# Patient Record
Sex: FEMALE | Race: WHITE | NOT HISPANIC OR LATINO | Employment: UNEMPLOYED | URBAN - METROPOLITAN AREA
[De-identification: names, ages, dates, MRNs, and addresses within clinical notes are randomized per-mention and may not be internally consistent; named-entity substitution may affect disease eponyms.]

---

## 2022-06-07 ENCOUNTER — APPOINTMENT (OUTPATIENT)
Dept: CT IMAGING | Facility: HOSPITAL | Age: 47
End: 2022-06-07

## 2022-06-07 ENCOUNTER — HOSPITAL ENCOUNTER (OUTPATIENT)
Facility: HOSPITAL | Age: 47
Setting detail: OBSERVATION
Discharge: HOME HEALTH | End: 2022-06-09
Attending: EMERGENCY MEDICINE | Admitting: STUDENT IN AN ORGANIZED HEALTH CARE EDUCATION/TRAINING PROGRAM

## 2022-06-07 ENCOUNTER — APPOINTMENT (OUTPATIENT)
Dept: GENERAL RADIOLOGY | Facility: HOSPITAL | Age: 47
End: 2022-06-07

## 2022-06-07 DIAGNOSIS — R51.9 ACUTE INTRACTABLE HEADACHE, UNSPECIFIED HEADACHE TYPE: ICD-10-CM

## 2022-06-07 DIAGNOSIS — R42 DIZZINESS: Primary | ICD-10-CM

## 2022-06-07 DIAGNOSIS — R42 VERTIGO: ICD-10-CM

## 2022-06-07 LAB
ABO GROUP BLD: NORMAL
ALBUMIN SERPL-MCNC: 4.5 G/DL (ref 3.5–5.2)
ALBUMIN/GLOB SERPL: 1.7 G/DL
ALP SERPL-CCNC: 83 U/L (ref 39–117)
ALT SERPL W P-5'-P-CCNC: 20 U/L (ref 1–33)
ANION GAP SERPL CALCULATED.3IONS-SCNC: 12 MMOL/L (ref 5–15)
AST SERPL-CCNC: 15 U/L (ref 1–32)
BASOPHILS # BLD AUTO: 0.1 10*3/MM3 (ref 0–0.2)
BASOPHILS NFR BLD AUTO: 1.2 % (ref 0–1.5)
BILIRUB SERPL-MCNC: 0.4 MG/DL (ref 0–1.2)
BLD GP AB SCN SERPL QL: NEGATIVE
BUN SERPL-MCNC: 9 MG/DL (ref 6–20)
BUN/CREAT SERPL: 12.2 (ref 7–25)
CALCIUM SPEC-SCNC: 10.3 MG/DL (ref 8.6–10.5)
CHLORIDE SERPL-SCNC: 103 MMOL/L (ref 98–107)
CO2 SERPL-SCNC: 24 MMOL/L (ref 22–29)
CREAT SERPL-MCNC: 0.74 MG/DL (ref 0.57–1)
DEPRECATED RDW RBC AUTO: 39.8 FL (ref 37–54)
EGFRCR SERPLBLD CKD-EPI 2021: 100.6 ML/MIN/1.73
EOSINOPHIL # BLD AUTO: 0.2 10*3/MM3 (ref 0–0.4)
EOSINOPHIL NFR BLD AUTO: 2.3 % (ref 0.3–6.2)
ERYTHROCYTE [DISTWIDTH] IN BLOOD BY AUTOMATED COUNT: 13.7 % (ref 12.3–15.4)
GLOBULIN UR ELPH-MCNC: 2.6 GM/DL
GLUCOSE SERPL-MCNC: 100 MG/DL (ref 65–99)
HCT VFR BLD AUTO: 38.6 % (ref 34–46.6)
HGB BLD-MCNC: 12.4 G/DL (ref 12–15.9)
HOLD SPECIMEN: NORMAL
HOLD SPECIMEN: NORMAL
INR PPP: 1.06 (ref 0.93–1.1)
LYMPHOCYTES # BLD AUTO: 2.7 10*3/MM3 (ref 0.7–3.1)
LYMPHOCYTES NFR BLD AUTO: 38.8 % (ref 19.6–45.3)
MAGNESIUM SERPL-MCNC: 2 MG/DL (ref 1.6–2.6)
MCH RBC QN AUTO: 26.3 PG (ref 26.6–33)
MCHC RBC AUTO-ENTMCNC: 32.2 G/DL (ref 31.5–35.7)
MCV RBC AUTO: 81.6 FL (ref 79–97)
MONOCYTES # BLD AUTO: 0.4 10*3/MM3 (ref 0.1–0.9)
MONOCYTES NFR BLD AUTO: 5.4 % (ref 5–12)
NEUTROPHILS NFR BLD AUTO: 3.6 10*3/MM3 (ref 1.7–7)
NEUTROPHILS NFR BLD AUTO: 52.3 % (ref 42.7–76)
NRBC BLD AUTO-RTO: 0.1 /100 WBC (ref 0–0.2)
PLATELET # BLD AUTO: 313 10*3/MM3 (ref 140–450)
PMV BLD AUTO: 6.9 FL (ref 6–12)
POTASSIUM SERPL-SCNC: 3.6 MMOL/L (ref 3.5–5.2)
PROT SERPL-MCNC: 7.1 G/DL (ref 6–8.5)
PROTHROMBIN TIME: 10.9 SECONDS (ref 9.6–11.7)
RBC # BLD AUTO: 4.73 10*6/MM3 (ref 3.77–5.28)
RH BLD: POSITIVE
SODIUM SERPL-SCNC: 139 MMOL/L (ref 136–145)
T&S EXPIRATION DATE: NORMAL
TSH SERPL DL<=0.05 MIU/L-ACNC: 3.18 UIU/ML (ref 0.27–4.2)
WBC NRBC COR # BLD: 7 10*3/MM3 (ref 3.4–10.8)
WHOLE BLOOD HOLD COAG: NORMAL
WHOLE BLOOD HOLD SPECIMEN: NORMAL

## 2022-06-07 PROCEDURE — 70496 CT ANGIOGRAPHY HEAD: CPT

## 2022-06-07 PROCEDURE — 93005 ELECTROCARDIOGRAM TRACING: CPT

## 2022-06-07 PROCEDURE — 96374 THER/PROPH/DIAG INJ IV PUSH: CPT

## 2022-06-07 PROCEDURE — 93005 ELECTROCARDIOGRAM TRACING: CPT | Performed by: EMERGENCY MEDICINE

## 2022-06-07 PROCEDURE — 99284 EMERGENCY DEPT VISIT MOD MDM: CPT

## 2022-06-07 PROCEDURE — 96376 TX/PRO/DX INJ SAME DRUG ADON: CPT

## 2022-06-07 PROCEDURE — 70450 CT HEAD/BRAIN W/O DYE: CPT

## 2022-06-07 PROCEDURE — 96372 THER/PROPH/DIAG INJ SC/IM: CPT

## 2022-06-07 PROCEDURE — 86900 BLOOD TYPING SEROLOGIC ABO: CPT

## 2022-06-07 PROCEDURE — 0 IOPAMIDOL PER 1 ML: Performed by: EMERGENCY MEDICINE

## 2022-06-07 PROCEDURE — 63710000001 ONDANSETRON ODT 4 MG TABLET DISPERSIBLE: Performed by: PHYSICIAN ASSISTANT

## 2022-06-07 PROCEDURE — 71045 X-RAY EXAM CHEST 1 VIEW: CPT

## 2022-06-07 PROCEDURE — 25010000002 KETOROLAC TROMETHAMINE PER 15 MG: Performed by: PHYSICIAN ASSISTANT

## 2022-06-07 PROCEDURE — 36415 COLL VENOUS BLD VENIPUNCTURE: CPT | Performed by: EMERGENCY MEDICINE

## 2022-06-07 PROCEDURE — 83735 ASSAY OF MAGNESIUM: CPT | Performed by: PHYSICIAN ASSISTANT

## 2022-06-07 PROCEDURE — 80050 GENERAL HEALTH PANEL: CPT | Performed by: PHYSICIAN ASSISTANT

## 2022-06-07 PROCEDURE — 70498 CT ANGIOGRAPHY NECK: CPT

## 2022-06-07 PROCEDURE — 82607 VITAMIN B-12: CPT | Performed by: STUDENT IN AN ORGANIZED HEALTH CARE EDUCATION/TRAINING PROGRAM

## 2022-06-07 PROCEDURE — 86900 BLOOD TYPING SEROLOGIC ABO: CPT | Performed by: PHYSICIAN ASSISTANT

## 2022-06-07 PROCEDURE — 85610 PROTHROMBIN TIME: CPT | Performed by: PHYSICIAN ASSISTANT

## 2022-06-07 PROCEDURE — 86901 BLOOD TYPING SEROLOGIC RH(D): CPT

## 2022-06-07 PROCEDURE — 86901 BLOOD TYPING SEROLOGIC RH(D): CPT | Performed by: PHYSICIAN ASSISTANT

## 2022-06-07 PROCEDURE — 99285 EMERGENCY DEPT VISIT HI MDM: CPT

## 2022-06-07 PROCEDURE — 86850 RBC ANTIBODY SCREEN: CPT | Performed by: PHYSICIAN ASSISTANT

## 2022-06-07 RX ORDER — ONDANSETRON 4 MG/1
4 TABLET, ORALLY DISINTEGRATING ORAL ONCE
Status: COMPLETED | OUTPATIENT
Start: 2022-06-07 | End: 2022-06-07

## 2022-06-07 RX ORDER — DEXAMETHASONE SODIUM PHOSPHATE 4 MG/ML
10 INJECTION, SOLUTION INTRA-ARTICULAR; INTRALESIONAL; INTRAMUSCULAR; INTRAVENOUS; SOFT TISSUE ONCE
Status: COMPLETED | OUTPATIENT
Start: 2022-06-07 | End: 2022-06-08

## 2022-06-07 RX ORDER — SODIUM CHLORIDE 0.9 % (FLUSH) 0.9 %
10 SYRINGE (ML) INJECTION AS NEEDED
Status: DISCONTINUED | OUTPATIENT
Start: 2022-06-07 | End: 2022-06-09 | Stop reason: HOSPADM

## 2022-06-07 RX ORDER — MECLIZINE HYDROCHLORIDE 25 MG/1
25 TABLET ORAL ONCE
Status: COMPLETED | OUTPATIENT
Start: 2022-06-07 | End: 2022-06-07

## 2022-06-07 RX ORDER — KETOROLAC TROMETHAMINE 30 MG/ML
30 INJECTION, SOLUTION INTRAMUSCULAR; INTRAVENOUS ONCE
Status: COMPLETED | OUTPATIENT
Start: 2022-06-07 | End: 2022-06-07

## 2022-06-07 RX ADMIN — MECLIZINE HYDROCHLORIDE 25 MG: 25 TABLET ORAL at 20:26

## 2022-06-07 RX ADMIN — IOPAMIDOL 100 ML: 755 INJECTION, SOLUTION INTRAVENOUS at 22:40

## 2022-06-07 RX ADMIN — ONDANSETRON 4 MG: 4 TABLET, ORALLY DISINTEGRATING ORAL at 23:52

## 2022-06-07 RX ADMIN — KETOROLAC TROMETHAMINE 30 MG: 30 INJECTION, SOLUTION INTRAMUSCULAR at 23:52

## 2022-06-07 NOTE — ED PROVIDER NOTES
Subjective       Patient is a 47-year-old female comes in complaining of multiple complaints since this morning.  Patient's last known well was around 10 PM last night.  Patient states that she woke up around 7:30 AM feeling dizzy like the room spinning as well as lightheaded like she may pass out.  Patient states that she has had issues with her right ear for the last few days and was seen by urgent care and had this drained and reports that she could hear better and no longer had any ear discomfort.  Patient was then referred to the ER for further evaluation given her symptoms today.  Patient denies any episode of issues like this in the past.  Patient states some intermittent blurred vision along with this.  Patient denies any blurred vision currently.  Patient denies any fever, chills, recent illness, vomiting, diarrhea, abdominal pain, chest pain, shortness of breath or cough.  Patient states that she has had some photophobia and nausea today.  Patient does report a history of migraines but states she is not treated for this.  Patient denies any falls or head injury.  Patient states she currently has a headache of her forehead bilaterally that is about 3 out of 10 that is constant and nonradiating and denies any thunderclap onset or worst headache of her life.        Review of Systems   Constitutional: Negative for chills, fatigue and fever.   HENT: Negative for congestion, sore throat, tinnitus and trouble swallowing.    Eyes: Positive for photophobia and visual disturbance (resolved). Negative for discharge.   Respiratory: Negative for cough, shortness of breath and wheezing.    Cardiovascular: Negative for chest pain, palpitations and leg swelling.   Gastrointestinal: Positive for nausea. Negative for abdominal pain, blood in stool, diarrhea and vomiting.   Genitourinary: Negative for dysuria, flank pain, frequency and urgency.   Musculoskeletal: Negative for arthralgias and myalgias.   Skin: Negative for  rash.   Neurological: Positive for dizziness and light-headedness. Negative for syncope, speech difficulty, weakness and headaches.   Psychiatric/Behavioral: Negative for confusion.       Past Medical History:   Diagnosis Date   • History of seasonal allergies        Allergies   Allergen Reactions   • Penicillin G Other (See Comments)   • Phenergan [Promethazine] Nausea Only   • Penicillins Nausea Only     States had had PCN from a dentist/didn't bother her then       Past Surgical History:   Procedure Laterality Date   • EAR TUBES     • HYSTERECTOMY      full   • TONSILLECTOMY         Family History   Problem Relation Age of Onset   • Pancreatic cancer Mother    • Hypertension Mother    • Ovarian cancer Sister    • Stomach cancer Maternal Grandfather        Social History     Socioeconomic History   • Marital status:    Tobacco Use   • Smoking status: Former Smoker     Quit date:      Years since quittin.4   • Smokeless tobacco: Never Used   Vaping Use   • Vaping Use: Never used   Substance and Sexual Activity   • Alcohol use: Yes     Alcohol/week: 2.0 standard drinks     Types: 2 Cans of beer per week     Comment: rarely   • Drug use: Defer   • Sexual activity: Defer           Objective   Physical Exam  Vitals and nursing note reviewed.   Constitutional:       General: She is not in acute distress.     Appearance: She is well-developed. She is not diaphoretic.   HENT:      Head: Normocephalic and atraumatic.      Right Ear: Tympanic membrane, ear canal and external ear normal. There is no impacted cerumen.      Left Ear: Tympanic membrane, ear canal and external ear normal. There is no impacted cerumen.      Nose: Nose normal. No congestion or rhinorrhea.      Mouth/Throat:      Mouth: Mucous membranes are moist.      Pharynx: No oropharyngeal exudate or posterior oropharyngeal erythema.   Eyes:      Extraocular Movements: Extraocular movements intact.      Conjunctiva/sclera: Conjunctivae normal.  "     Pupils: Pupils are equal, round, and reactive to light.   Cardiovascular:      Rate and Rhythm: Normal rate and regular rhythm.      Pulses: Normal pulses.      Heart sounds: Normal heart sounds.      Comments: S1, S2 audible.  Pulmonary:      Effort: Pulmonary effort is normal. No respiratory distress.      Breath sounds: Normal breath sounds. No wheezing, rhonchi or rales.      Comments: On room air.  Abdominal:      General: Bowel sounds are normal. There is no distension.      Palpations: Abdomen is soft.      Tenderness: There is no abdominal tenderness. There is no guarding or rebound.   Musculoskeletal:         General: No tenderness or deformity. Normal range of motion.      Cervical back: Normal range of motion.      Right lower leg: No edema.      Left lower leg: No edema.   Skin:     General: Skin is warm.      Capillary Refill: Capillary refill takes less than 2 seconds.      Findings: No erythema or rash.      Comments: No temporal tenderness bilaterally.   Neurological:      General: No focal deficit present.      Mental Status: She is alert and oriented to person, place, and time.      Cranial Nerves: No cranial nerve deficit.      Sensory: No sensory deficit.      Motor: No weakness.      Coordination: Coordination normal.      Comments: Cranial nerves II through XII intact.  Motor: 5+ upper extremity lower extremity bilaterally, flexors and extensors symmetric.  Sensation: Grossly intact to fine touch upper extremity and lower extremity bilaterally symmetric.  Cerebellar: FTN bilaterally.  No tremor noted. No Romberg drift.  Tone: Normal bulk and tone in upper and lower extremities.  No atrophy noted.   NIH of 0   Psychiatric:         Mood and Affect: Mood normal.         Behavior: Behavior normal.         Procedures           ED Course      /72   Pulse 68   Temp 97.9 °F (36.6 °C) (Oral)   Resp 16   Ht 154.9 cm (61\")   Wt 75.2 kg (165 lb 12.6 oz)   SpO2 100%   BMI 31.32 kg/m² "   Labs Reviewed   COMPREHENSIVE METABOLIC PANEL - Abnormal; Notable for the following components:       Result Value    Glucose 100 (*)     All other components within normal limits    Narrative:     GFR Normal >60  Chronic Kidney Disease <60  Kidney Failure <15     CBC WITH AUTO DIFFERENTIAL - Abnormal; Notable for the following components:    MCH 26.3 (*)     All other components within normal limits   PROTIME-INR - Normal   TSH - Normal   MAGNESIUM - Normal   COVID-19,CEPHEID/PRINCESS,COR/NELLY/PAD/JOSE ENRIQUE IN-HOUSE,NP SWAB IN TRANSPORT MEDIA 3-4 HR TAT, RT-PCR   RAINBOW DRAW    Narrative:     The following orders were created for panel order [place] Draw.  Procedure                               Abnormality         Status                     ---------                               -----------         ------                     Green Top (Gel)[[id number]]                                  Final result               Lavender Top[[id number]]                                     Final result               Gold Top - SST[[id number]]                                   Final result               Light Blue Top[[id number]]                                   Final result                 Please view results for these tests on the individual orders.   POCT GLUCOSE FINGERSTICK   TYPE AND SCREEN   BB ARMBAND CHECK   CBC AND DIFFERENTIAL    Narrative:     The following orders were created for panel order CBC & Differential.  Procedure                               Abnormality         Status                     ---------                               -----------         ------                     CBC Auto Differential[[id number]]        Abnormal            Final result                 Please view results for these tests on the individual orders.   GREEN TOP   LAVENDER TOP   GOLD TOP - SST   LIGHT BLUE TOP     CT Angiogram Neck    Result Date: 6/7/2022  1. No occlusion, significant stenosis, or evidence of dissection in the cervical carotid or  "vertebral arteries. 2. Normal CT angiography of the head. No large vessel occlusion or significant stenosis. 3. Enlarged and heterogeneous thyroid gland. Correlate with thyroid function tests. Electronically signed by:  Ralph Hi M.D.  6/7/2022 9:25 PM    XR Chest 1 View    Result Date: 6/7/2022  No active disease.  Electronically Signed By-Tr Portillo MD On:6/7/2022 8:00 PM This report was finalized on [id number] by  Tr Portillo MD.    CT Head Without Contrast Stroke Protocol    Result Date: 6/7/2022  IMPRESSION :  1. No acute findings. 2. Chronic maxillary sinus disease.  Electronically Signed By-Tr Portillo MD On:6/7/2022 10:32 PM This report was finalized on [id number] by  Tr Portillo MD.    CT Angiogram Head w AI Analysis of LVO    Result Date: 6/7/2022  1. No occlusion, significant stenosis, or evidence of dissection in the cervical carotid or vertebral arteries. 2. Normal CT angiography of the head. No large vessel occlusion or significant stenosis. 3. Enlarged and heterogeneous thyroid gland. Correlate with thyroid function tests. Electronically signed by:  Ralph Hi M.D.  6/7/2022 9:25 PM                                               MDM     Chart review: Allergies reviewed  EKG: EKG reviewed by myself interpreted by , shows sinus rhythm 64 bpm, no ST elevation apparent.    Imaging: See above  Labs: TSH normal.  CBC and CMP large unremarkable for acute findings.  COVID-19 swab refused by patient.  PT/INR normal.  Patient typed and screened.  Magnesium normal.    Vitals:  /72   Pulse 68   Temp 97.9 °F (36.6 °C) (Oral)   Resp 16   Ht 154.9 cm (61\")   Wt 75.2 kg (165 lb 12.6 oz)   SpO2 100%   BMI 31.32 kg/m²     Medications given:    Medications   sodium chloride 0.9 % flush 10 mL (has no administration in time range)   meclizine (ANTIVERT) tablet 25 mg (25 mg Oral Given 6/7/22 2026)   iopamidol (ISOVUE-370) 76 % injection 100 mL (100 mL Intravenous Given 6/7/22 2240) "   ketorolac (TORADOL) injection 30 mg (30 mg Intramuscular Given 6/7/22 2352)   ondansetron ODT (ZOFRAN-ODT) disintegrating tablet 4 mg (4 mg Oral Given 6/7/22 2352)   dexamethasone (DECADRON) injection 10 mg (10 mg Intravenous Given 6/8/22 0044)       Procedures:    MDM: Patient is a 47-year-old female comes in complaining of headache, dizziness.  Patient's dizziness is made worse with movement and position changes.  TSH normal.  CBC and CMP large unremarkable for acute findings.  COVID-19 swab refused by patient.  PT/INR normal.  Patient typed and screened.  Magnesium normal.  Patient was given meclizine with no relief.  Patient was given Toradol and Zofran for headache with minimal relief.  Spoke with on-call neurologist, Dr. Tinoco, who recommended MRI with and without contrast of the brain as well as IV Decadron 10 mg as well as admission to the hospital. These things were ordered.  Spoke with Dr. Calles, who accepted patient behalf of hospitalist team for admission to hospital further work-up and treatment.  See full discharge instructions for further details.  Results and plan discussed with patient and is agreeable with plan.    Final diagnoses:   Dizziness   Acute intractable headache, unspecified headache type       ED Disposition  ED Disposition     ED Disposition   Decision to Admit    Condition   --    Comment   Level of Care: Telemetry [5]   Admitting Physician: SHERRY CALLES [[id number]]   Attending Physician: SHERRY CALLES [[id number]]   Bed Request Comments: JACQUELINE               No follow-up provider specified.       Medication List      No changes were made to your prescriptions during this visit.          Dioni Joe PA  06/08/22 0103

## 2022-06-07 NOTE — ED NOTES
Pt states that she's been dizzy since this am. Pt states that nothing makes it better but the car ride to urgent care made it so much worse. Pt c/o of a pressure headache and its not like the migraines she's had before. Pt states she is very nauseous. Pt states she took some tylenol around 2-230 PM and it gave some relief but not much.  Pt states that her vision is different. She states that she wears readers but its hard to see.

## 2022-06-08 ENCOUNTER — APPOINTMENT (OUTPATIENT)
Dept: MRI IMAGING | Facility: HOSPITAL | Age: 47
End: 2022-06-08

## 2022-06-08 ENCOUNTER — APPOINTMENT (OUTPATIENT)
Dept: CARDIOLOGY | Facility: HOSPITAL | Age: 47
End: 2022-06-08

## 2022-06-08 PROBLEM — R42 DIZZINESS: Status: ACTIVE | Noted: 2022-06-08

## 2022-06-08 PROBLEM — G45.9 TRANSIENT ISCHEMIC ATTACK (TIA): Status: ACTIVE | Noted: 2022-06-08

## 2022-06-08 PROBLEM — F43.9 STRESS AT HOME: Status: ACTIVE | Noted: 2022-06-08

## 2022-06-08 LAB
ALBUMIN SERPL-MCNC: 4 G/DL (ref 3.5–5.2)
ALBUMIN/GLOB SERPL: 1.4 G/DL
ALP SERPL-CCNC: 83 U/L (ref 39–117)
ALT SERPL W P-5'-P-CCNC: 17 U/L (ref 1–33)
ANION GAP SERPL CALCULATED.3IONS-SCNC: 15 MMOL/L (ref 5–15)
AST SERPL-CCNC: 12 U/L (ref 1–32)
BILIRUB SERPL-MCNC: 0.3 MG/DL (ref 0–1.2)
BUN SERPL-MCNC: 14 MG/DL (ref 6–20)
BUN/CREAT SERPL: 15.1 (ref 7–25)
CALCIUM SPEC-SCNC: 9.4 MG/DL (ref 8.6–10.5)
CHLORIDE SERPL-SCNC: 103 MMOL/L (ref 98–107)
CHOLEST SERPL-MCNC: 174 MG/DL (ref 0–200)
CO2 SERPL-SCNC: 19 MMOL/L (ref 22–29)
CREAT SERPL-MCNC: 0.93 MG/DL (ref 0.57–1)
DEPRECATED RDW RBC AUTO: 39.4 FL (ref 37–54)
EGFRCR SERPLBLD CKD-EPI 2021: 76.4 ML/MIN/1.73
ERYTHROCYTE [DISTWIDTH] IN BLOOD BY AUTOMATED COUNT: 13.6 % (ref 12.3–15.4)
GLOBULIN UR ELPH-MCNC: 2.9 GM/DL
GLUCOSE SERPL-MCNC: 347 MG/DL (ref 65–99)
HBA1C MFR BLD: 5.6 % (ref 3.5–5.6)
HCT VFR BLD AUTO: 39.5 % (ref 34–46.6)
HDLC SERPL-MCNC: 47 MG/DL (ref 40–60)
HGB BLD-MCNC: 12.7 G/DL (ref 12–15.9)
LDLC SERPL CALC-MCNC: 107 MG/DL (ref 0–100)
LDLC/HDLC SERPL: 2.23 {RATIO}
MCH RBC QN AUTO: 26.2 PG (ref 26.6–33)
MCHC RBC AUTO-ENTMCNC: 32.3 G/DL (ref 31.5–35.7)
MCV RBC AUTO: 81.3 FL (ref 79–97)
PLATELET # BLD AUTO: 319 10*3/MM3 (ref 140–450)
PMV BLD AUTO: 7.2 FL (ref 6–12)
POTASSIUM SERPL-SCNC: 4.3 MMOL/L (ref 3.5–5.2)
PROT SERPL-MCNC: 6.9 G/DL (ref 6–8.5)
RBC # BLD AUTO: 4.86 10*6/MM3 (ref 3.77–5.28)
SARS-COV-2 RNA PNL SPEC NAA+PROBE: NOT DETECTED
SODIUM SERPL-SCNC: 137 MMOL/L (ref 136–145)
TRIGL SERPL-MCNC: 111 MG/DL (ref 0–150)
TSH SERPL DL<=0.05 MIU/L-ACNC: 0.82 UIU/ML (ref 0.27–4.2)
VIT B12 BLD-MCNC: 407 PG/ML (ref 211–946)
VLDLC SERPL-MCNC: 20 MG/DL (ref 5–40)
WBC NRBC COR # BLD: 14 10*3/MM3 (ref 3.4–10.8)

## 2022-06-08 PROCEDURE — G0378 HOSPITAL OBSERVATION PER HR: HCPCS

## 2022-06-08 PROCEDURE — 83036 HEMOGLOBIN GLYCOSYLATED A1C: CPT | Performed by: INTERNAL MEDICINE

## 2022-06-08 PROCEDURE — 97166 OT EVAL MOD COMPLEX 45 MIN: CPT

## 2022-06-08 PROCEDURE — 99219 PR INITIAL OBSERVATION CARE/DAY 50 MINUTES: CPT | Performed by: INTERNAL MEDICINE

## 2022-06-08 PROCEDURE — 95992 CANALITH REPOSITIONING PROC: CPT

## 2022-06-08 PROCEDURE — 96375 TX/PRO/DX INJ NEW DRUG ADDON: CPT

## 2022-06-08 PROCEDURE — 25010000002 GADOTERIDOL PER 1 ML: Performed by: STUDENT IN AN ORGANIZED HEALTH CARE EDUCATION/TRAINING PROGRAM

## 2022-06-08 PROCEDURE — 25010000002 ENOXAPARIN PER 10 MG: Performed by: INTERNAL MEDICINE

## 2022-06-08 PROCEDURE — A9579 GAD-BASE MR CONTRAST NOS,1ML: HCPCS | Performed by: STUDENT IN AN ORGANIZED HEALTH CARE EDUCATION/TRAINING PROGRAM

## 2022-06-08 PROCEDURE — 36415 COLL VENOUS BLD VENIPUNCTURE: CPT | Performed by: STUDENT IN AN ORGANIZED HEALTH CARE EDUCATION/TRAINING PROGRAM

## 2022-06-08 PROCEDURE — 80050 GENERAL HEALTH PANEL: CPT | Performed by: INTERNAL MEDICINE

## 2022-06-08 PROCEDURE — 97162 PT EVAL MOD COMPLEX 30 MIN: CPT

## 2022-06-08 PROCEDURE — 99214 OFFICE O/P EST MOD 30 MIN: CPT | Performed by: PSYCHIATRY & NEUROLOGY

## 2022-06-08 PROCEDURE — 85027 COMPLETE CBC AUTOMATED: CPT | Performed by: STUDENT IN AN ORGANIZED HEALTH CARE EDUCATION/TRAINING PROGRAM

## 2022-06-08 PROCEDURE — 80048 BASIC METABOLIC PNL TOTAL CA: CPT | Performed by: STUDENT IN AN ORGANIZED HEALTH CARE EDUCATION/TRAINING PROGRAM

## 2022-06-08 PROCEDURE — 70553 MRI BRAIN STEM W/O & W/DYE: CPT

## 2022-06-08 PROCEDURE — 87635 SARS-COV-2 COVID-19 AMP PRB: CPT | Performed by: PHYSICIAN ASSISTANT

## 2022-06-08 PROCEDURE — 25010000002 DEXAMETHASONE PER 1 MG: Performed by: PHYSICIAN ASSISTANT

## 2022-06-08 PROCEDURE — 25010000002 DEXAMETHASONE PER 1 MG: Performed by: STUDENT IN AN ORGANIZED HEALTH CARE EDUCATION/TRAINING PROGRAM

## 2022-06-08 PROCEDURE — 93306 TTE W/DOPPLER COMPLETE: CPT | Performed by: INTERNAL MEDICINE

## 2022-06-08 PROCEDURE — 80061 LIPID PANEL: CPT | Performed by: INTERNAL MEDICINE

## 2022-06-08 PROCEDURE — 93306 TTE W/DOPPLER COMPLETE: CPT

## 2022-06-08 RX ORDER — BUTALBITAL, ACETAMINOPHEN AND CAFFEINE 50; 325; 40 MG/1; MG/1; MG/1
2 TABLET ORAL EVERY 6 HOURS PRN
Status: DISCONTINUED | OUTPATIENT
Start: 2022-06-08 | End: 2022-06-09 | Stop reason: HOSPADM

## 2022-06-08 RX ORDER — ENOXAPARIN SODIUM 100 MG/ML
40 INJECTION SUBCUTANEOUS DAILY
Status: DISCONTINUED | OUTPATIENT
Start: 2022-06-08 | End: 2022-06-09 | Stop reason: HOSPADM

## 2022-06-08 RX ORDER — ASPIRIN 300 MG/1
300 SUPPOSITORY RECTAL DAILY
Status: DISCONTINUED | OUTPATIENT
Start: 2022-06-08 | End: 2022-06-09 | Stop reason: HOSPADM

## 2022-06-08 RX ORDER — SUMATRIPTAN 25 MG/1
25 TABLET, FILM COATED ORAL ONCE
Status: COMPLETED | OUTPATIENT
Start: 2022-06-08 | End: 2022-06-08

## 2022-06-08 RX ORDER — DEXAMETHASONE SODIUM PHOSPHATE 4 MG/ML
6 INJECTION, SOLUTION INTRA-ARTICULAR; INTRALESIONAL; INTRAMUSCULAR; INTRAVENOUS; SOFT TISSUE DAILY
Status: DISCONTINUED | OUTPATIENT
Start: 2022-06-08 | End: 2022-06-09 | Stop reason: HOSPADM

## 2022-06-08 RX ORDER — ASPIRIN 325 MG
325 TABLET ORAL DAILY
Status: DISCONTINUED | OUTPATIENT
Start: 2022-06-08 | End: 2022-06-09 | Stop reason: HOSPADM

## 2022-06-08 RX ORDER — ATORVASTATIN CALCIUM 40 MG/1
80 TABLET, FILM COATED ORAL NIGHTLY
Status: DISCONTINUED | OUTPATIENT
Start: 2022-06-08 | End: 2022-06-09 | Stop reason: HOSPADM

## 2022-06-08 RX ORDER — BUTALBITAL, ACETAMINOPHEN AND CAFFEINE 50; 325; 40 MG/1; MG/1; MG/1
1 TABLET ORAL EVERY 4 HOURS PRN
Status: DISCONTINUED | OUTPATIENT
Start: 2022-06-08 | End: 2022-06-08 | Stop reason: SDUPTHER

## 2022-06-08 RX ADMIN — ATORVASTATIN CALCIUM 80 MG: 40 TABLET, FILM COATED ORAL at 03:24

## 2022-06-08 RX ADMIN — BUTALBITAL, ACETAMINOPHEN AND CAFFEINE 2 TABLET: 50; 325; 40 TABLET ORAL at 14:45

## 2022-06-08 RX ADMIN — SUMATRIPTAN SUCCINATE 25 MG: 25 TABLET ORAL at 21:24

## 2022-06-08 RX ADMIN — ENOXAPARIN SODIUM 40 MG: 100 INJECTION SUBCUTANEOUS at 03:24

## 2022-06-08 RX ADMIN — ASPIRIN 325 MG ORAL TABLET 325 MG: 325 PILL ORAL at 03:24

## 2022-06-08 RX ADMIN — GADOTERIDOL 15 ML: 279.3 INJECTION, SOLUTION INTRAVENOUS at 10:30

## 2022-06-08 RX ADMIN — DEXAMETHASONE SODIUM PHOSPHATE 10 MG: 4 INJECTION, SOLUTION INTRAMUSCULAR; INTRAVENOUS at 00:44

## 2022-06-08 RX ADMIN — ATORVASTATIN CALCIUM 80 MG: 40 TABLET, FILM COATED ORAL at 21:24

## 2022-06-08 RX ADMIN — DEXAMETHASONE SODIUM PHOSPHATE 6 MG: 4 INJECTION, SOLUTION INTRAMUSCULAR; INTRAVENOUS at 12:56

## 2022-06-08 NOTE — DISCHARGE INSTR - OTHER ORDERS
[facility] - Follow-up for outpatient rehab  [phone] [address] [place] [address]

## 2022-06-08 NOTE — PLAN OF CARE
Goal Outcome Evaluation:      Orders generated as per stroke workup. The patient passed the stroke swallow screen and can initiate diet as per MD order/recommendations. CT of the head negative. MRI showed no acute intracranial abnormality. Per chart, all deficits have resolved. ST will complete order and sign off at this time as per protocol. Please re-consult if our services are warranted in the future. Thank you.
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
## 2022-06-08 NOTE — CASE MANAGEMENT/SOCIAL WORK
Discharge Planning Assessment  [facility]     Patient Name: Queta Cuadra  MRN: [id number]  Today's Date: 6/8/2022    Admit Date: 6/7/2022     Discharge Needs Assessment     Row Name 06/08/22 0825       Living Environment    People in Home significant other    Current Living Arrangements home    Primary Care Provided by self    Provides Primary Care For no one    Family Caregiver if Needed significant other    Quality of Family Relationships helpful;involved    Able to Return to Prior Arrangements yes       Resource/Environmental Concerns    Resource/Environmental Concerns none    Transportation Concerns none       Transition Planning    Patient/Family Anticipates Transition to home with family    Patient/Family Anticipated Services at Transition     Transportation Anticipated car, drives self;family or friend will provide       Discharge Needs Assessment    Readmission Within the Last 30 Days no previous admission in last 30 days    Equipment Currently Used at Home none    Concerns to be Addressed no discharge needs identified    Anticipated Changes Related to Illness none    Provided Post Acute Provider List? N/A    Provided Post Acute Provider Quality & Resource List? N/A               Discharge Plan     Row Name 06/08/22 0827       Plan    Plan DC PLAN: Routine home, Pending PT/OT eval    Patient/Family in Agreement with Plan yes    Provided Post Acute Provider List? N/A    Provided Post Acute Provider Quality & Resource List? N/A    Plan Comments Met with patient and  at bedside. From Routine home with  [place], Independent with ADL's, No DME. Verified PCP and Pharmacy. No transportation issues. Able to afford medications. Pending PT/OT eval for further discharge plan.                 Demographic Summary     Row Name 06/08/22 0824       General Information    Admission Type observation    Arrived From emergency department    Referral Source emergency department    Reason for Consult care  coordination/care conference;discharge planning    Preferred Language English       Contact Information    Permission Granted to Share Info With     Contact Information Obtained for                Functional Status     Row Name 06/08/22 0825       Functional Status    Usual Activity Tolerance moderate    Current Activity Tolerance moderate       Functional Status, IADL    Medications independent    Meal Preparation independent    Housekeeping independent    Laundry independent    Shopping independent       Mental Status    General Appearance WDL WDL              Met with patient in room wearing PPE: mask,     Maintained distance greater than six feet and spent less than 15 minutes in the room.      Mónica Steinberg RN      Office phone: [phone]  Cell phone: [phone]

## 2022-06-08 NOTE — PROGRESS NOTES
[facility] Medicine Services Daily Progress Note    Patient Name: Queta Cuadra  : 1975  MRN: [id number]  Primary Care Physician:  Fay De Jesus MD  Date of admission: 2022      Subjective      Chief Complaint: Vertigo      2022  Patient complains of vertigo that is worse with motion and slightly better with rest, also complains of headache    Review of Systems   Constitutional: Positive for malaise/fatigue. Negative for chills and fever.   HENT: Negative for ear discharge, ear pain and hearing loss.    Eyes: Negative for discharge, double vision and pain.   Cardiovascular: Negative for chest pain, dyspnea on exertion and leg swelling.   Respiratory: Negative for cough, hemoptysis and shortness of breath.    Endocrine: Negative for polydipsia, polyphagia and polyuria.   Skin: Negative for dry skin, flushing and itching.   Musculoskeletal: Negative for arthritis, back pain and falls.   Gastrointestinal: Negative for abdominal pain, constipation and diarrhea.   Genitourinary: Negative for dysuria, flank pain and frequency.   Neurological: Positive for headaches, vertigo and weakness.   Psychiatric/Behavioral: Negative for altered mental status, depression and hallucinations.          Objective      Vitals:   Temp:  [97.9 °F (36.6 °C)-98.4 °F (36.9 °C)] 97.9 °F (36.6 °C)  Heart Rate:  [64-85] 68  Resp:  [11-18] 13  BP: (107-130)/(61-84) 127/84    Physical Exam  Constitutional:       General: She is not in acute distress.     Appearance: Normal appearance.   HENT:      Head: Normocephalic and atraumatic.      Nose: Nose normal.      Mouth/Throat:      Pharynx: Oropharynx is clear.   Eyes:      General: No scleral icterus.     Extraocular Movements: Extraocular movements intact.      Pupils: Pupils are equal, round, and reactive to light.      Comments: Slight horizontal nystagmus   Cardiovascular:      Rate and Rhythm: Normal rate and regular rhythm.      Heart sounds: No  murmur heard.    No friction rub. No gallop.   Pulmonary:      Effort: No respiratory distress.      Breath sounds: No wheezing or rales.   Abdominal:      General: There is no distension.      Tenderness: There is no abdominal tenderness. There is no guarding.   Musculoskeletal:         General: No swelling or deformity.      Cervical back: Normal range of motion. No rigidity.      Right lower leg: No edema.      Left lower leg: No edema.   Skin:     Coloration: Skin is not jaundiced.      Findings: No bruising or lesion.   Neurological:      General: No focal deficit present.      Mental Status: She is alert and oriented to person, place, and time.      Motor: Weakness present.   Psychiatric:         Mood and Affect: Mood normal.         Behavior: Behavior normal.         Thought Content: Thought content normal.         Judgment: Judgment normal.             Result Review    Result Review:  I have personally reviewed the results from the time of this admission to 6/8/2022 10:41 EDT and agree with these findings:  [x]  Laboratory  []  Microbiology  [x]  Radiology  []  EKG/Telemetry   []  Cardiology/Vascular   []  Pathology  []  Old records  []  Other:            Assessment & Plan      Brief Patient Summary:  Queta Cuadra is a 47 y.o. female who presented due to vertigo      aspirin, 325 mg, Oral, Daily   Or  aspirin, 300 mg, Rectal, Daily  atorvastatin, 80 mg, Oral, Nightly  enoxaparin, 40 mg, Subcutaneous, Daily  gadoteridol, 15 mL, Intravenous, Once in imaging             Active Hospital Problems:  Active Hospital Problems    Diagnosis    • Vertigo    • Stress at home    • Transient ischemic attack (TIA)      Plan:   #Vertigo    - admits to severe vertigo with since yesterday monring    - ED contacted Neuro, MRI recommended along with IV steroids    - rule out basilar stroke vs demyelination disease    - decadron 6mg IV daily    - Neuro consulted    - CT head without mass, shift, or hemorrhage    - CTA  head/neck without occlsion, significant stenosis, or evidence of dissection    - MRI brain with and without contrast     - will cover with stroke protocol: aspirin, statin, echo    - Possible BPPV in the setting of waxy ears recently cleaned out complicated by home stress    - TSH 3.18    - PT/OT/SLP    - Covid negative    - EKG NSR without evidence of ischemia      DVT prophylaxis:  Medical and mechanical DVT prophylaxis orders are present.    CODE STATUS:    Level Of Support Discussed With: Patient  Code Status (Patient has no pulse and is not breathing): CPR (Attempt to Resuscitate)  Medical Interventions (Patient has pulse or is breathing): Full Support      Disposition:  I expect patient to be discharged in 1-2 days.    This patient has been examined wearing appropriate Personal Protective Equipment and discussed with PAtient and . 06/08/22      Electronically signed by Lobo Rios DO, 06/08/22, 10:41 EDT.  [facility] Hospitalist Team
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
## 2022-06-09 ENCOUNTER — HOME HEALTH ADMISSION (OUTPATIENT)
Dept: HOME HEALTH SERVICES | Facility: HOME HEALTHCARE | Age: 47
End: 2022-06-09

## 2022-06-09 ENCOUNTER — READMISSION MANAGEMENT (OUTPATIENT)
Dept: CALL CENTER | Facility: HOSPITAL | Age: 47
End: 2022-06-09

## 2022-06-09 VITALS
HEIGHT: 61 IN | DIASTOLIC BLOOD PRESSURE: 61 MMHG | TEMPERATURE: 97.8 F | HEART RATE: 89 BPM | OXYGEN SATURATION: 98 % | WEIGHT: 165 LBS | BODY MASS INDEX: 31.15 KG/M2 | SYSTOLIC BLOOD PRESSURE: 97 MMHG | RESPIRATION RATE: 14 BRPM

## 2022-06-09 LAB
ANION GAP SERPL CALCULATED.3IONS-SCNC: 16 MMOL/L (ref 5–15)
BH CV ECHO MEAS - ACS: 1.97 CM
BH CV ECHO MEAS - AO MAX PG: 7.2 MMHG
BH CV ECHO MEAS - AO MEAN PG: 4.1 MMHG
BH CV ECHO MEAS - AO ROOT DIAM: 3.1 CM
BH CV ECHO MEAS - AO V2 MAX: 134.5 CM/SEC
BH CV ECHO MEAS - AO V2 VTI: 27.8 CM
BH CV ECHO MEAS - AVA(I,D): 2.8 CM2
BH CV ECHO MEAS - EDV(CUBED): 82 ML
BH CV ECHO MEAS - EDV(MOD-SP4): 78.3 ML
BH CV ECHO MEAS - EF(MOD-BP): 65 %
BH CV ECHO MEAS - EF(MOD-SP4): 65 %
BH CV ECHO MEAS - ESV(CUBED): 19.6 ML
BH CV ECHO MEAS - ESV(MOD-SP4): 27.4 ML
BH CV ECHO MEAS - FS: 37.9 %
BH CV ECHO MEAS - IVS/LVPW: 1.1 CM
BH CV ECHO MEAS - IVSD: 0.75 CM
BH CV ECHO MEAS - LA A2CS (ATRIAL LENGTH): 2.6 CM
BH CV ECHO MEAS - LV DIASTOLIC VOL/BSA (35-75): 45 CM2
BH CV ECHO MEAS - LV MASS(C)D: 93.2 GRAMS
BH CV ECHO MEAS - LV MAX PG: 6.3 MMHG
BH CV ECHO MEAS - LV MEAN PG: 3.3 MMHG
BH CV ECHO MEAS - LV SYSTOLIC VOL/BSA (12-30): 15.8 CM2
BH CV ECHO MEAS - LV V1 MAX: 125.9 CM/SEC
BH CV ECHO MEAS - LV V1 VTI: 24.7 CM
BH CV ECHO MEAS - LVIDD: 4.3 CM
BH CV ECHO MEAS - LVIDS: 2.7 CM
BH CV ECHO MEAS - LVOT AREA: 3.1 CM2
BH CV ECHO MEAS - LVOT DIAM: 1.99 CM
BH CV ECHO MEAS - LVPWD: 0.69 CM
BH CV ECHO MEAS - MV A MAX VEL: 83.3 CM/SEC
BH CV ECHO MEAS - MV DEC SLOPE: 343.4 CM/SEC2
BH CV ECHO MEAS - MV DEC TIME: 0.19 MSEC
BH CV ECHO MEAS - MV E MAX VEL: 66 CM/SEC
BH CV ECHO MEAS - MV E/A: 0.79
BH CV ECHO MEAS - MV MAX PG: 2.9 MMHG
BH CV ECHO MEAS - MV MEAN PG: 1.59 MMHG
BH CV ECHO MEAS - MV V2 VTI: 17.5 CM
BH CV ECHO MEAS - MVA(VTI): 4.4 CM2
BH CV ECHO MEAS - PA V2 MAX: 94.3 CM/SEC
BH CV ECHO MEAS - PULM A REVS DUR: 0.09 SEC
BH CV ECHO MEAS - PULM A REVS VEL: 39.9 CM/SEC
BH CV ECHO MEAS - PULM DIAS VEL: 38.9 CM/SEC
BH CV ECHO MEAS - PULM S/D: 1.52
BH CV ECHO MEAS - PULM SYS VEL: 59 CM/SEC
BH CV ECHO MEAS - QP/QS: 0.53
BH CV ECHO MEAS - RV MAX PG: 3.3 MMHG
BH CV ECHO MEAS - RV V1 MAX: 90.5 CM/SEC
BH CV ECHO MEAS - RV V1 VTI: 21.2 CM
BH CV ECHO MEAS - RVDD: 1.85 CM
BH CV ECHO MEAS - RVOT DIAM: 1.57 CM
BH CV ECHO MEAS - SI(MOD-SP4): 29.2 ML/M2
BH CV ECHO MEAS - SV(LVOT): 77.3 ML
BH CV ECHO MEAS - SV(MOD-SP4): 50.9 ML
BH CV ECHO MEAS - SV(RVOT): 41 ML
BUN SERPL-MCNC: 16 MG/DL (ref 6–20)
BUN/CREAT SERPL: 20 (ref 7–25)
CALCIUM SPEC-SCNC: 9.6 MG/DL (ref 8.6–10.5)
CHLORIDE SERPL-SCNC: 111 MMOL/L (ref 98–107)
CO2 SERPL-SCNC: 20 MMOL/L (ref 22–29)
CREAT SERPL-MCNC: 0.8 MG/DL (ref 0.57–1)
DEPRECATED RDW RBC AUTO: 39.8 FL (ref 37–54)
EGFRCR SERPLBLD CKD-EPI 2021: 91.6 ML/MIN/1.73
ERYTHROCYTE [DISTWIDTH] IN BLOOD BY AUTOMATED COUNT: 13.8 % (ref 12.3–15.4)
GLUCOSE BLDC GLUCOMTR-MCNC: 102 MG/DL (ref 70–105)
GLUCOSE BLDC GLUCOMTR-MCNC: 137 MG/DL (ref 70–105)
GLUCOSE BLDC GLUCOMTR-MCNC: 141 MG/DL (ref 70–105)
GLUCOSE SERPL-MCNC: 181 MG/DL (ref 65–99)
HCT VFR BLD AUTO: 40.6 % (ref 34–46.6)
HGB BLD-MCNC: 13.2 G/DL (ref 12–15.9)
MAXIMAL PREDICTED HEART RATE: 173 BPM
MCH RBC QN AUTO: 26.5 PG (ref 26.6–33)
MCHC RBC AUTO-ENTMCNC: 32.5 G/DL (ref 31.5–35.7)
MCV RBC AUTO: 81.4 FL (ref 79–97)
PLATELET # BLD AUTO: 317 10*3/MM3 (ref 140–450)
PMV BLD AUTO: 7.1 FL (ref 6–12)
POTASSIUM SERPL-SCNC: 4.7 MMOL/L (ref 3.5–5.2)
RBC # BLD AUTO: 4.99 10*6/MM3 (ref 3.77–5.28)
SODIUM SERPL-SCNC: 147 MMOL/L (ref 136–145)
STRESS TARGET HR: 147 BPM
WBC NRBC COR # BLD: 15 10*3/MM3 (ref 3.4–10.8)

## 2022-06-09 PROCEDURE — 82962 GLUCOSE BLOOD TEST: CPT

## 2022-06-09 PROCEDURE — 99217 PR OBSERVATION CARE DISCHARGE MANAGEMENT: CPT | Performed by: STUDENT IN AN ORGANIZED HEALTH CARE EDUCATION/TRAINING PROGRAM

## 2022-06-09 PROCEDURE — G0378 HOSPITAL OBSERVATION PER HR: HCPCS

## 2022-06-09 PROCEDURE — 25010000002 DEXAMETHASONE PER 1 MG: Performed by: STUDENT IN AN ORGANIZED HEALTH CARE EDUCATION/TRAINING PROGRAM

## 2022-06-09 PROCEDURE — 95992 CANALITH REPOSITIONING PROC: CPT

## 2022-06-09 PROCEDURE — 97116 GAIT TRAINING THERAPY: CPT

## 2022-06-09 PROCEDURE — 96376 TX/PRO/DX INJ SAME DRUG ADON: CPT

## 2022-06-09 RX ORDER — SUMATRIPTAN 50 MG/1
50 TABLET, FILM COATED ORAL
Status: DISCONTINUED | OUTPATIENT
Start: 2022-06-09 | End: 2022-06-09 | Stop reason: HOSPADM

## 2022-06-09 RX ORDER — MECLIZINE HYDROCHLORIDE 25 MG/1
25 TABLET ORAL 3 TIMES DAILY PRN
Qty: 30 TABLET | Refills: 0 | Status: SHIPPED | OUTPATIENT
Start: 2022-06-09

## 2022-06-09 RX ORDER — SODIUM CHLORIDE 450 MG/100ML
100 INJECTION, SOLUTION INTRAVENOUS CONTINUOUS
Status: DISCONTINUED | OUTPATIENT
Start: 2022-06-09 | End: 2022-06-09 | Stop reason: HOSPADM

## 2022-06-09 RX ORDER — SUMATRIPTAN 50 MG/1
50 TABLET, FILM COATED ORAL
Qty: 30 TABLET | Refills: 1 | Status: SHIPPED | OUTPATIENT
Start: 2022-06-09

## 2022-06-09 RX ORDER — PREDNISONE 10 MG/1
TABLET ORAL
Qty: 20 TABLET | Refills: 0 | Status: SHIPPED | OUTPATIENT
Start: 2022-06-09 | End: 2022-06-17

## 2022-06-09 RX ADMIN — SUMATRIPTAN SUCCINATE 50 MG: 50 TABLET ORAL at 11:14

## 2022-06-09 RX ADMIN — SUMATRIPTAN SUCCINATE 50 MG: 50 TABLET ORAL at 17:20

## 2022-06-09 RX ADMIN — ASPIRIN 325 MG ORAL TABLET 325 MG: 325 PILL ORAL at 11:20

## 2022-06-09 RX ADMIN — DEXAMETHASONE SODIUM PHOSPHATE 6 MG: 4 INJECTION, SOLUTION INTRAMUSCULAR; INTRAVENOUS at 11:20

## 2022-06-09 NOTE — PLAN OF CARE
Assessment: Queta Cuadra presents with functional mobility impairments which indicate the need for skilled intervention. Tolerating session today without incident. Pt continues to complaint of dizziness with mobility, and terrible pressure headache. States it was improved this AM, but has become worse as the day goes on. PT performed epley maneuver x2 this date, with pt reporting slight improvement following each. Pt does c/o of pressure when head extended over EOB and states she can feel the pressure shift with head turns. VOR, benny pursuit and head head impulse tested and pt did not tolerate well, but no significant nystagmus nor saccadic movements notice. No nystagmus noted with epley maneuver this date either, just c/o pressure and dizziness when moving into lying position. Pt states all motion, including moving bed makes her very dizzy. Will continue to follow and progress as tolerated.

## 2022-06-09 NOTE — DISCHARGE SUMMARY
[facility] Medicine Services  DISCHARGE SUMMARY    Patient Name: Queta Cuadra  : 1975  MRN: [id number]    Date of Admission: 2022  Discharge Diagnosis: Migraine with vestibular dysfunction  Date of Discharge:  2022  Primary Care Physician: Fay De Jesus MD      Presenting Problem:   Dizziness [R42]  Vertigo [R42]  Acute intractable headache, unspecified headache type [R51.9]    Active and Resolved Hospital Problems:  Active Hospital Problems    Diagnosis POA   • Vertigo [R42] Yes   • Stress at home [F43.9] Not Applicable   • Transient ischemic attack (TIA) [G45.9] Yes      Resolved Hospital Problems   No resolved problems to display.         Hospital Course     Hospital Course:  Queta Cuadra is a 47 y.o. female who presented to [facility] on 2022 due to dizziness and headache affecting balance.  ED called neurology and stroke workup recommended.  CT head without mass/shift/hemorrhage.  CTA head/neck without significant stenosis or thrombosis.  MRI without ischemia.  Neuro suspected vertiginous migraine.  PT recommended otpt vesibular rehab.  She had significant relief with imitrex.  On   Was started on Prednisone along with imitrex. No further medical intervention to be done in hospital.  She was cleared for discharge by neuro and PT.  Patient was agreeable to discharge and in agreement with plan below.        DISCHARGE Follow Up Recommendations for labs and diagnostics:       - Take Antivert 25mg PO TID PRN nausea/dizziness  - Take Prednisone taper as prescribed  - Take Imitrex 50mg PO q2h PRN migrain; take one tablet at onset of headache and may repeat one dose in two hours if migraine not relieved. Do not take more than 200mg in 24 hours.  - [facility] with vestibular rehab arranged    Day of Discharge     Vital Signs:  Temp:  [97.7 °F (36.5 °C)-98.3 °F (36.8 °C)] 97.7 °F (36.5 °C)  Heart Rate:  [] 81  Resp:  [12-17] 12  BP: (106-128)/(63-66)  106/63    Physical Exam:  Physical Exam  Constitutional:       General: She is not in acute distress.     Appearance: She is not toxic-appearing.   HENT:      Head: Normocephalic and atraumatic.      Nose: Nose normal. No congestion.      Mouth/Throat:      Pharynx: Oropharynx is clear. No oropharyngeal exudate.   Eyes:      General: No scleral icterus.  Cardiovascular:      Rate and Rhythm: Normal rate and regular rhythm.      Heart sounds: No murmur heard.    No friction rub. No gallop.   Pulmonary:      Effort: No respiratory distress.      Breath sounds: No wheezing or rales.   Abdominal:      General: There is no distension.      Tenderness: There is no abdominal tenderness. There is no guarding.   Musculoskeletal:         General: No swelling or deformity.      Cervical back: Normal range of motion. No rigidity.      Right lower leg: No edema.      Left lower leg: No edema.   Skin:     Coloration: Skin is not jaundiced.      Findings: No bruising or lesion.   Neurological:      General: No focal deficit present.      Mental Status: She is alert and oriented to person, place, and time.      Motor: No weakness.   Psychiatric:         Mood and Affect: Mood normal.         Behavior: Behavior normal.         Thought Content: Thought content normal.         Judgment: Judgment normal.            Pertinent  and/or Most Recent Results     LAB RESULTS:      Lab 06/08/22 2333 06/08/22 2027 06/07/22 2025   WBC 15.00* 14.00* 7.00   HEMOGLOBIN 13.2 12.7 12.4   HEMATOCRIT 40.6 39.5 38.6   PLATELETS 317 319 313   NEUTROS ABS  --   --  3.60   LYMPHS ABS  --   --  2.70   MONOS ABS  --   --  0.40   EOS ABS  --   --  0.20   MCV 81.4 81.3 81.6   PROTIME  --   --  10.9         Lab 06/08/22 2333 06/08/22 2027 06/07/22 2025   SODIUM 147* 137 139   POTASSIUM 4.7 4.3 3.6   CHLORIDE 111* 103 103   CO2 20.0* 19.0* 24.0   ANION GAP 16.0* 15.0 12.0   BUN 16 14 9   CREATININE 0.80 0.93 0.74   EGFR 91.6 76.4 100.6   GLUCOSE 181* 347*  100*   CALCIUM 9.6 9.4 10.3   MAGNESIUM  --   --  2.0   HEMOGLOBIN A1C  --  5.6  --    TSH  --  0.820 3.180         Lab 06/08/22 2027 06/07/22 2025   TOTAL PROTEIN 6.9 7.1   ALBUMIN 4.00 4.50   GLOBULIN 2.9 2.6   ALT (SGPT) 17 20   AST (SGOT) 12 15   BILIRUBIN 0.3 0.4   ALK PHOS 83 83         Lab 06/07/22 2025   PROTIME 10.9   INR 1.06         Lab 06/08/22 2027   CHOLESTEROL 174   LDL CHOL 107*   HDL CHOL 47   TRIGLYCERIDES 111         Lab 06/07/22 2025   VITAMIN B 12 407   ABO TYPING A   RH TYPING Positive   ANTIBODY SCREEN Negative         Brief Urine Lab Results     None        Microbiology Results (last 10 days)     Procedure Component Value - Date/Time    COVID-19,CEPHEID/PRINCESS,COR/NELLY/PAD/JOSE ENRIQUE IN-HOUSE(OR EMERGENT/ADD-ON),NP SWAB IN TRANSPORT MEDIA 3-4 HR TAT, RT-PCR - Swab, Nasopharynx [[id number]]  (Normal) Collected: 06/08/22 0051    Lab Status: Final result Specimen: Swab from Nasopharynx Updated: 06/08/22 0120     COVID19 Not Detected    Narrative:      Fact sheet for providers: https://www.fda.gov/media/931726/download     Fact sheet for patients: https://www.fda.gov/media/250358/download  Fact sheet for providers: https://www.fda.gov/media/562802/download    Fact sheet for patients: https://www.fda.gov/media/611234/download    Test performed by PCR.          CT Angiogram Neck    Result Date: 6/7/2022  Impression: 1. No occlusion, significant stenosis, or evidence of dissection in the cervical carotid or vertebral arteries. 2. Normal CT angiography of the head. No large vessel occlusion or significant stenosis. 3. Enlarged and heterogeneous thyroid gland. Correlate with thyroid function tests. Electronically signed by:  Ralph Hi M.D.  6/7/2022 9:25 PM    MRI Brain With & Without Contrast    Result Date: 6/8/2022  Impression:  1. No acute intracranial findings. 2. Few small nonenhancing FLAIR signal intensity changes within the left frontal lobe are nonspecific. The findings could reflect changes  of microvascular disease. Other etiologies such as vasculitis, demyelinating process, sequelae of migraines, etc., could also be considered in the differential. 3. Mild bilateral maxillary sinus mucosal thickening.   Electronically Signed By-Coco Madrid MD On:6/8/2022 11:14 AM This report was finalized on [id number] by  Coco Madrid MD.    XR Chest 1 View    Result Date: 6/7/2022  Impression: No active disease.  Electronically Signed By-Tr Portillo MD On:6/7/2022 8:00 PM This report was finalized on [id number] by  Tr Portillo MD.    CT Head Without Contrast Stroke Protocol    Result Date: 6/7/2022  Impression: IMPRESSION :  1. No acute findings. 2. Chronic maxillary sinus disease.  Electronically Signed By-Tr Portillo MD On:6/7/2022 10:32 PM This report was finalized on [id number] by  Tr Portillo MD.    CT Angiogram Head w AI Analysis of LVO    Result Date: 6/7/2022  Impression: 1. No occlusion, significant stenosis, or evidence of dissection in the cervical carotid or vertebral arteries. 2. Normal CT angiography of the head. No large vessel occlusion or significant stenosis. 3. Enlarged and heterogeneous thyroid gland. Correlate with thyroid function tests. Electronically signed by:  Ralph Hi M.D.  6/7/2022 9:25 PM                  Labs Pending at Discharge:      Procedures Performed           Consults:   Consults     Date and Time Order Name Status Description    6/8/2022  2:50 AM Inpatient Neurology Consult Stroke      6/7/2022 11:46 PM Inpatient Neurology Consult Stroke Completed             Discharge Details        Discharge Medications      New Medications      Instructions Start Date   meclizine 25 MG tablet  Commonly known as: ANTIVERT   25 mg, Oral, 3 Times Daily PRN      predniSONE 10 MG tablet  Commonly known as: DELTASONE   Take 4 tablets by mouth Daily for 2 days, THEN 3 tablets Daily for 2 days, THEN 2 tablets Daily for 2 days, THEN 1 tablet Daily for 2 days.   Start Date: June  9, 2022     SUMAtriptan 50 MG tablet  Commonly known as: Imitrex   50 mg, Oral, Every 2 Hours PRN, Take one tablet at onset of headache. May repeat dose one time in 2 hours if headache not relieved. Do not take more than 200mg in 24 hours             Allergies   Allergen Reactions   • Penicillin G Other (See Comments)   • Phenergan [Promethazine] Nausea Only   • Penicillins Nausea Only     States had had PCN from a dentist/didn't bother her then         Discharge Disposition: Home with [facility]  Home-Health Care [facility]   Condition on discharge: GOOD    Diet:  Hospital:  Diet Order   Procedures   • Diet Regular         Discharge Activity:   Activity Instructions     Activity as Tolerated              CODE STATUS:  Code Status and Medical Interventions:   Ordered at: 06/08/22 0250     Level Of Support Discussed With:    Patient     Code Status (Patient has no pulse and is not breathing):    CPR (Attempt to Resuscitate)     Medical Interventions (Patient has pulse or is breathing):    Full Support         No future appointments.    Additional Instructions for the Follow-ups that You Need to Schedule     Ambulatory Referral to [facility])   As directed      Vestibular dysfunction    Order Comments: Vestibular dysfunction     Face to Face Visit Date: 6/9/2022    Follow-up provider for Plan of Care?: I treated the patient in an acute care facility and will not continue treatment after discharge.    Follow-up provider: AMERICO DIAZ [[id number]]    Reason/Clinical Findings: referral for [facility] with DX of vestibular    Describe mobility limitations that make leaving home difficult: referral for [facility] with DX of vestibular    Nursing/Therapeutic Services Requested: Physical Therapy    PT orders: Other (add comment) Gait Training Therapeutic exercise    Weight Bearing Status: As Tolerated    Frequency: 1 Week 1         Ambulatory Referral to Physical Therapy Vestibular   As directed      Specialty needed: Vestibular         Call  MD With Problems / Concerns   As directed      Instructions: Call [phone] or email [email or web address] for problems or concerns.    Order Comments: Instructions: Call [phone] or email [email or web address] for problems or concerns.          Discharge Follow-up with PCP   As directed       Currently Documented PCP:    Fay De Jesus MD    PCP Phone Number:    [phone]     Follow Up Details: Follow up with PCP in 5-7 days               Time spent on Discharge including face to face service:  35 minutes    This patient has been examined wearing appropriate Personal Protective Equipment and discussed with PAtient and neurology and . 06/09/22      Signature: Electronically signed by Lobo Rios DO, 06/09/22, 2:38 PM EDT.

## 2022-06-09 NOTE — PLAN OF CARE
Goal Outcome Evaluation:  Plan of Care Reviewed With: patient        Progress: improving  Outcome Evaluation: Patient has been headache free since receiving PRN imitrex

## 2022-06-09 NOTE — THERAPY TREATMENT NOTE
"Subjective: Pt agreeable to therapeutic plan of care.    Objective:     Bed mobility - Modified-Independent  Transfers - Min-A with HHA  Ambulation - 5x2 feet Min-A requires handheld assist multiple major LOB       Vitals: WNL    Pain: 6 VAS c/o pressure headache throughout treatment.  Education: Provided education on importance of mobility and skilled verbal / tactile cueing throughout intervention.     Assessment: Queta Cuadra presents with functional mobility impairments which indicate the need for skilled intervention. Tolerating session today without incident. Pt continues to complaint of dizziness with mobility, and terrible pressure headache. States it was improved this AM, but has become worse as the day goes on. PT performed epley maneuver x2 this date, with pt reporting slight improvement following each. Pt does c/o of pressure when head extended over EOB and states she can feel the pressure shift with head turns. VOR, benny pursuit and head head impulse tested and pt did not tolerate well, but no significant nystagmus nor saccadic movements notice. No nystagmus noted with epley maneuver this date either, just c/o pressure and dizziness when moving into lying position. Pt states all motion, including moving bed makes her very dizzy. Will continue to follow and progress as tolerated.     Plan/Recommendations:   Low Intensity Therapy recommended post-acute care - This is recommended as therapy feels this patient would require 2-3 visits per week. OP or HH would be the best option depending on patient's home bound status. Consider, if the patient has other  \"skilled\" needs such as wounds, IV antibiotics, etc. Combined with \"low intensity\" could also equate to a SNF. If patient is medically complex, consider LTAC.. Pt requires no DME at discharge.     Pt desires Outpatient therapy at discharge. Pt cooperative; agreeable to therapeutic recommendations and plan of care.         Basic Mobility " 6-click:  Rollin = Total, A lot = 2, A little = 3; 4 = None  Supine>Sit:   1 = Total, A lot = 2, A little = 3; 4 = None   Sit>Stand with arms:  1 = Total, A lot = 2, A little = 3; 4 = None  Bed>Chair:   1 = Total, A lot = 2, A little = 3; 4 = None  Ambulate in room:  1 = Total, A lot = 2, A little = 3; 4 = None  3-5 Steps with railin = Total, A lot = 2, A little = 3; 4 = None  Score: 18    Modified [place]: 4 = Moderately severe disability (Unable to attend to own bodily needs without assistance, and unable to walk unassisted)     Post-Tx Position: Supine with HOB Elevated and Call light and personal items within reach  PPE: gloves, surgical mask, eyewear protection

## 2022-06-09 NOTE — DISCHARGE PLACEMENT REQUEST
"KhalifQueta (47 y.o. Female)             Date of Birth   1975    Social Security Number       Address   [address] [facility] [place] [address]    Home Phone   [phone]    MRN   [id number]       Adventism   None    Marital Status                               Admission Date   6/7/22    Admission Type   Emergency    Admitting Provider   Lboo Rios DO    Attending Provider   Lobo Rios DO    Department, Room/Bed   [facility] NEURO HEART, 273/1       Discharge Date       Discharge Disposition       Discharge Destination                               Attending Provider: Lobo Rios DO    Allergies: Penicillin G, Phenergan [Promethazine], Penicillins    Isolation: None   Infection: None   Code Status: CPR   Advance Care Planning Activity    Ht: 154.9 cm (61\")   Wt: 74.8 kg (165 lb)    Admission Cmt: None   Principal Problem: None                Active Insurance as of 6/7/2022     Primary Coverage     Payor Plan Insurance Group Employer/Plan Group    [employer] BLUE CROSS [facility] [employer] BLUE SHIELD PPO [id number]     Payor Plan Address Payor Plan Phone Number Payor Plan Fax Number Effective Dates    [address] [phone]  1/1/2022 - None Entered    [facility] [address]       Subscriber Name Subscriber Birth Date Member ID       KHALIFSARITA [birth year]/3/1973 [id number]                 Emergency Contacts      (Rel.) Home Phone Work Phone Mobile Phone    SARITA SCHULTZ (Spouse) -- -- [phone]              "

## 2022-06-09 NOTE — CASE MANAGEMENT/SOCIAL WORK
Continued Stay Note  [facility]     Patient Name: Queta Cuadra  MRN: [id number]  Today's Date: 6/9/2022    Admit Date: 6/7/2022     Discharge Plan     Row Name 06/09/22 1412       Plan    Plan DC Home w/[facility]  for Vesitbular PT/OT (accepted 6/9/22). Lives w/spouse.    Plan Comments [facility] does have a vestibular program and insurance is in-network with 20% copay. CM updated patient and MD. Dr. Rios placed ambulatory HH referral.  UPDATE 6/9/22 2:35 pm: Ayanna w/[facility] is having a hiccup with patient's PCP and is currently trying to confirm who will be following when discharged.    Row Name 06/09/22 1341                                 Expected Discharge Date and Time     Expected Discharge Date Expected Discharge Time    Jun 13, 2022           Phone communication or documentation only - no physical contact with patient or family.    Savi Calvert RN, MSN  Care Manager  [phone]

## 2022-06-09 NOTE — OUTREACH NOTE
Prep Survey    Flowsheet Row Responses   [facility] facility patient discharged from? [facility]   Is LACE score < 7 ? Yes   Emergency Room discharge w/ pulse ox? No   Eligibility TCM   Hospital [facility]   Date of Admission 06/07/22   Date of Discharge 06/09/22   Discharge Disposition [facility] Care [facility]   Discharge diagnosis Migraine with vestibular dysfunction   Does the patient have one of the following disease processes/diagnoses(primary or secondary)? Other   Does the patient have Home health ordered? Yes   What is the Home health agency?  [facility] for vestibular PT/OT   Is there a DME ordered? No   Prep survey completed? Yes          EVERETTE LUND - Registered Nurse

## 2022-06-09 NOTE — CASE MANAGEMENT/SOCIAL WORK
Continued Stay Note  [facility]     Patient Name: Queta Cuadra  MRN: [id number]  Today's Date: 6/9/2022    Admit Date: 6/7/2022     Discharge Plan     Row Name 06/09/22 1341       Plan    Plan Referral to [facility] for PT/OT (vestibular). Lives w/spouse.    Plan Comments CM to patients room to discuss OT recommendation for IP Rehab. 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 CM sent referral to [facility] and notified liaison. Patient lives w/spouse.                    Expected Discharge Date and Time     Expected Discharge Date Expected Discharge Time    Jun 13, 2022           Met with patient in room wearing PPE: mask, face shield/goggles, gloves, gown.      Maintained distance greater than six feet and spent less than 15 minutes in the room.    Savi Calvert RN, MSN  Care Manager  [phone]

## 2022-06-10 ENCOUNTER — HOME CARE VISIT (OUTPATIENT)
Dept: HOME HEALTH SERVICES | Facility: HOME HEALTHCARE | Age: 47
End: 2022-06-10

## 2022-06-10 ENCOUNTER — TRANSITIONAL CARE MANAGEMENT TELEPHONE ENCOUNTER (OUTPATIENT)
Dept: CALL CENTER | Facility: HOSPITAL | Age: 47
End: 2022-06-10

## 2022-06-10 LAB — QT INTERVAL: 394 MS

## 2022-06-10 PROCEDURE — G0151 HHCP-SERV OF PT,EA 15 MIN: HCPCS

## 2022-06-10 NOTE — PROGRESS NOTES
Verified demographics and agrees to [facility]   ARLENEErik Green will not follow for [facility]    She has an appt on 6.14.22 and will then decide if she will follow for [facility]     Pt aware we can not start until after that appt      PT admit if md agrees

## 2022-06-10 NOTE — CASE MANAGEMENT/SOCIAL WORK
Case Management Discharge Note      Final Note: DC Home w[facility]    Provided Post Acute Provider List?: N/A  Provided Post Acute Provider Quality & Resource List?: N/A        Home Medical Care Coordination complete.    Service Provider Selected Services Address Phone Fax Patient Preferred    [facility]  Home Health Services ,  Home Rehabilitation [address] [facility] [place] [address] [phone] --               Transportation Services  Private: Car    Final Discharge Disposition Code: 06 - home with home health care     Savi Calvert RN, MSN  Care Manager  [phone]

## 2022-06-10 NOTE — OUTREACH NOTE
Call Center TCM Note    Flowsheet Row Responses   [facility] patient discharged from? [facility]   Does the patient have one of the following disease processes/diagnoses(primary or secondary)? Other   TCM attempt successful? Yes   Call start time 1438   Call end time 1445   Discharge diagnosis Migraine with vestibular dysfunction   Meds reviewed with patient/caregiver? Yes   Is the patient having any side effects they believe may be caused by any medication additions or changes? No   Does the patient have all medications ordered at discharge? Yes   Is the patient taking all medications as directed (includes completed medication regime)? Yes   Does the patient have a primary care provider?  Yes   Does the patient have an appointment with their PCP within 7 days of discharge? Yes   Comments regarding PCP PCP is Ximena Green not  provider appt 6/14/22.    Has the patient kept scheduled appointments due by today? N/A   What is the Home health agency?  [facility] for vestibular PT/OT   Has home health visited the patient within 72 hours of discharge? Yes   Psychosocial issues? No   Did the patient receive a copy of their discharge instructions? Yes   What is the patient's perception of their health status since discharge? Improving  [however still with vertigo. ]   Is the patient/caregiver able to teach back signs and symptoms related to disease process for when to call PCP? Yes   Is the patient/caregiver able to teach back signs and symptoms related to disease process for when to call 911? Yes   Is the patient/caregiver able to teach back the hierarchy of who to call/visit for symptoms/problems? PCP, Specialist, Home health nurse, Urgent Care, ED, 911 Yes   TCM call completed? Yes   Wrap up additional comments Pt reports she still is not feeling well and has vertigo. Therapy was in today to see Pt. Pt reports that she no longer has Dr. De Jesus as she was dropped from the [facility] years ago.           Cyndi Rincon,  RN    6/10/2022, 14:45 EDT

## 2022-06-12 VITALS
DIASTOLIC BLOOD PRESSURE: 64 MMHG | WEIGHT: 160 LBS | HEART RATE: 85 BPM | SYSTOLIC BLOOD PRESSURE: 110 MMHG | HEIGHT: 61 IN | TEMPERATURE: 98.3 F | BODY MASS INDEX: 30.21 KG/M2 | OXYGEN SATURATION: 98 % | RESPIRATION RATE: 18 BRPM

## 2022-06-12 NOTE — HOME HEALTH
"Ms. Queta Cuadra is a 47-year old patient referred to this Agency with diagnosis of migraine with vestibular dysfunction. Patient stated she woke up on Tuesday morning (6/7/22) with sudden severe headaches and dizziness and inability to walk. She was on observation status from 6/8/22 through 6/9/22 at [facility] and currently at home with assistance of family. Patient lives on 2-level home located behind her in-laws. Spouse has a day job and father in law is assisting during the day as needed. Patient's mom, who has dementia, lives with her family and Ms. Cuadra stayed  home to help mom as primary caregiver. House spacious with no cluttered pathways and patient is currently on rollator walker.    Patient is very anxious and afraid to stand or walk for fear of falling. Able to perform sit to stand with cga and verbal/visual cues for coming up slowly without sudden head movements to prevent increased dizziness. She also ambulates about 50ft with rollator walker, cga and cues for slow turning to control dizziness. Home exercise for gaze stabilization established and patient and father in law stated good understanding. Patient would benefit from further skilled PT services for vestibular exs, transfer and gait training to to resolve dizziness and regain prior functional skills. Patient's goal is \"to walk by myself without getting dizzy\"."

## 2022-06-13 ENCOUNTER — HOME CARE VISIT (OUTPATIENT)
Dept: HOME HEALTH SERVICES | Facility: HOME HEALTHCARE | Age: 47
End: 2022-06-13

## 2022-06-13 PROCEDURE — G0151 HHCP-SERV OF PT,EA 15 MIN: HCPCS

## 2022-06-14 ENCOUNTER — HOME CARE VISIT (OUTPATIENT)
Dept: HOME HEALTH SERVICES | Facility: HOME HEALTHCARE | Age: 47
End: 2022-06-14

## 2022-06-15 VITALS
HEART RATE: 81 BPM | TEMPERATURE: 98 F | OXYGEN SATURATION: 99 % | SYSTOLIC BLOOD PRESSURE: 110 MMHG | RESPIRATION RATE: 18 BRPM | DIASTOLIC BLOOD PRESSURE: 60 MMHG

## 2022-06-15 NOTE — HOME HEALTH
Tolerated session well with ability to perform sit to stand, supine<->sit from couch and indoor ambulation with rollator walker. She reported no dizziness with supine<->sit this visit but still has dizzying issues with sitting and standing though better than last visit.

## 2022-06-16 ENCOUNTER — HOME CARE VISIT (OUTPATIENT)
Dept: HOME HEALTH SERVICES | Facility: HOME HEALTHCARE | Age: 47
End: 2022-06-16

## 2022-06-16 VITALS
HEART RATE: 98 BPM | DIASTOLIC BLOOD PRESSURE: 80 MMHG | TEMPERATURE: 97.5 F | SYSTOLIC BLOOD PRESSURE: 130 MMHG | OXYGEN SATURATION: 97 %

## 2022-06-16 PROCEDURE — G0151 HHCP-SERV OF PT,EA 15 MIN: HCPCS

## 2022-06-16 NOTE — HOME HEALTH
Pt reports still very difficult to ride in car for MD appts due to dizziness. Pt states NP wants to start outpatient but she is not able to due to severe dizziness with car rides.     Plan for next visit: Gaze stabilization, transfer training, balance and gait training.

## 2022-06-20 ENCOUNTER — HOME CARE VISIT (OUTPATIENT)
Dept: HOME HEALTH SERVICES | Facility: HOME HEALTHCARE | Age: 47
End: 2022-06-20

## 2022-06-20 VITALS
TEMPERATURE: 97.7 F | DIASTOLIC BLOOD PRESSURE: 80 MMHG | OXYGEN SATURATION: 97 % | SYSTOLIC BLOOD PRESSURE: 110 MMHG | HEART RATE: 71 BPM

## 2022-06-20 PROCEDURE — G0151 HHCP-SERV OF PT,EA 15 MIN: HCPCS

## 2022-06-20 NOTE — HOME HEALTH
Pt reports still has dizziness but seems to be controlled with meds. Patient states scheduled to start outpatient PT on 6.24.22    Plan for next visit review HEP DC PT.

## 2022-06-23 ENCOUNTER — HOME CARE VISIT (OUTPATIENT)
Dept: HOME HEALTH SERVICES | Facility: HOME HEALTHCARE | Age: 47
End: 2022-06-23

## 2022-06-24 NOTE — CASE COMMUNICATION
Patient requested to be discharged without visit. Patient last seen in the home on 6/20/2022 by Javier.   Javier, I believe you will need to do an OASIS discharge since you were the last PT to see the patient. I have never seen this patient.

## 2022-06-24 NOTE — CASE COMMUNICATION
Informational Purposes Only:  Per home health agency protocol, MD must be notified of any cancelled visits.    Patient declined home health PT visit scheduled on 6/23/2022.  Plan is for patient to transition to OP PT.

## 2023-10-10 ENCOUNTER — OFFICE VISIT (OUTPATIENT)
Dept: FAMILY MEDICINE CLINIC | Facility: CLINIC | Age: 48
End: 2023-10-10
Payer: COMMERCIAL

## 2023-10-10 ENCOUNTER — LAB (OUTPATIENT)
Dept: FAMILY MEDICINE CLINIC | Facility: CLINIC | Age: 48
End: 2023-10-10
Payer: COMMERCIAL

## 2023-10-10 VITALS
BODY MASS INDEX: 32.17 KG/M2 | RESPIRATION RATE: 18 BRPM | WEIGHT: 170.4 LBS | HEART RATE: 76 BPM | SYSTOLIC BLOOD PRESSURE: 122 MMHG | HEIGHT: 61 IN | DIASTOLIC BLOOD PRESSURE: 78 MMHG | OXYGEN SATURATION: 98 %

## 2023-10-10 DIAGNOSIS — Z13.1 DIABETES MELLITUS SCREENING: ICD-10-CM

## 2023-10-10 DIAGNOSIS — R03.0 ELEVATED BLOOD PRESSURE READING: ICD-10-CM

## 2023-10-10 DIAGNOSIS — E66.09 OBESITY DUE TO EXCESS CALORIES WITHOUT SERIOUS COMORBIDITY, UNSPECIFIED CLASSIFICATION: ICD-10-CM

## 2023-10-10 DIAGNOSIS — F43.9 STRESS AT HOME: ICD-10-CM

## 2023-10-10 DIAGNOSIS — F41.8 DEPRESSION WITH ANXIETY: Primary | ICD-10-CM

## 2023-10-10 DIAGNOSIS — Z13.220 LIPID SCREENING: ICD-10-CM

## 2023-10-10 DIAGNOSIS — G43.109 VERTIGINOUS MIGRAINE: ICD-10-CM

## 2023-10-10 PROBLEM — G45.9 TRANSIENT ISCHEMIC ATTACK (TIA): Status: RESOLVED | Noted: 2022-06-08 | Resolved: 2023-10-10

## 2023-10-10 PROCEDURE — 80050 GENERAL HEALTH PANEL: CPT | Performed by: INTERNAL MEDICINE

## 2023-10-10 PROCEDURE — 83036 HEMOGLOBIN GLYCOSYLATED A1C: CPT | Performed by: INTERNAL MEDICINE

## 2023-10-10 PROCEDURE — 36415 COLL VENOUS BLD VENIPUNCTURE: CPT

## 2023-10-10 PROCEDURE — 80061 LIPID PANEL: CPT | Performed by: INTERNAL MEDICINE
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
## 2023-10-10 NOTE — PATIENT INSTRUCTIONS
Please stop at lab on second floor to have blood drawn    Medications:  Continue current medications as prescribed    Encourage healthy diet and exercise    Try to do things that you enjoy    Follow up for recheck in about 1 month

## 2023-10-11 LAB
ALBUMIN SERPL-MCNC: 4.9 G/DL (ref 3.5–5.2)
ALBUMIN/GLOB SERPL: 1.8 G/DL
ALP SERPL-CCNC: 92 U/L (ref 39–117)
ALT SERPL W P-5'-P-CCNC: 21 U/L (ref 1–33)
ANION GAP SERPL CALCULATED.3IONS-SCNC: 12.4 MMOL/L (ref 5–15)
AST SERPL-CCNC: 20 U/L (ref 1–32)
BILIRUB SERPL-MCNC: 0.4 MG/DL (ref 0–1.2)
BUN SERPL-MCNC: 13 MG/DL (ref 6–20)
BUN/CREAT SERPL: 14.3 (ref 7–25)
CALCIUM SPEC-SCNC: 9.9 MG/DL (ref 8.6–10.5)
CHLORIDE SERPL-SCNC: 103 MMOL/L (ref 98–107)
CHOLEST SERPL-MCNC: 213 MG/DL (ref 0–200)
CO2 SERPL-SCNC: 26.6 MMOL/L (ref 22–29)
CREAT SERPL-MCNC: 0.91 MG/DL (ref 0.57–1)
DEPRECATED RDW RBC AUTO: 37.8 FL (ref 37–54)
EGFRCR SERPLBLD CKD-EPI 2021: 78 ML/MIN/1.73
ERYTHROCYTE [DISTWIDTH] IN BLOOD BY AUTOMATED COUNT: 12.6 % (ref 12.3–15.4)
GLOBULIN UR ELPH-MCNC: 2.7 GM/DL
GLUCOSE SERPL-MCNC: 66 MG/DL (ref 65–99)
HBA1C MFR BLD: 5.9 % (ref 4.8–5.6)
HCT VFR BLD AUTO: 40.9 % (ref 34–46.6)
HDLC SERPL-MCNC: 41 MG/DL (ref 40–60)
HGB BLD-MCNC: 13.6 G/DL (ref 12–15.9)
LDLC SERPL CALC-MCNC: 141 MG/DL (ref 0–100)
LDLC/HDLC SERPL: 3.35 {RATIO}
MCH RBC QN AUTO: 27.4 PG (ref 26.6–33)
MCHC RBC AUTO-ENTMCNC: 33.3 G/DL (ref 31.5–35.7)
MCV RBC AUTO: 82.5 FL (ref 79–97)
PLATELET # BLD AUTO: 299 10*3/MM3 (ref 140–450)
PMV BLD AUTO: 9 FL (ref 6–12)
POTASSIUM SERPL-SCNC: 4.4 MMOL/L (ref 3.5–5.2)
PROT SERPL-MCNC: 7.6 G/DL (ref 6–8.5)
RBC # BLD AUTO: 4.96 10*6/MM3 (ref 3.77–5.28)
SODIUM SERPL-SCNC: 142 MMOL/L (ref 136–145)
TRIGL SERPL-MCNC: 173 MG/DL (ref 0–150)
TSH SERPL DL<=0.05 MIU/L-ACNC: 3.29 UIU/ML (ref 0.27–4.2)
VLDLC SERPL-MCNC: 31 MG/DL (ref 5–40)
WBC NRBC COR # BLD: 5.09 10*3/MM3 (ref 3.4–10.8)

## 2023-11-15 ENCOUNTER — OFFICE VISIT (OUTPATIENT)
Dept: FAMILY MEDICINE CLINIC | Facility: CLINIC | Age: 48
End: 2023-11-15
Payer: COMMERCIAL

## 2023-11-15 VITALS
WEIGHT: 169.8 LBS | BODY MASS INDEX: 32.06 KG/M2 | HEART RATE: 93 BPM | DIASTOLIC BLOOD PRESSURE: 78 MMHG | RESPIRATION RATE: 17 BRPM | SYSTOLIC BLOOD PRESSURE: 112 MMHG | OXYGEN SATURATION: 98 % | HEIGHT: 61 IN

## 2023-11-15 DIAGNOSIS — F41.8 DEPRESSION WITH ANXIETY: Primary | ICD-10-CM

## 2023-11-15 DIAGNOSIS — R73.03 PREDIABETES: ICD-10-CM

## 2023-11-15 PROCEDURE — 99213 OFFICE O/P EST LOW 20 MIN: CPT | Performed by: INTERNAL MEDICINE

## 2023-11-15 NOTE — PATIENT INSTRUCTIONS
Encourage healthy diet and exercise    Hold on medication for mood    Encouraged patient to do things they enjoy, reduce stressors    Flonase daily for eustachian tube dysfunction    Follow up in 3 months for preventative care visit; update vaccines, cancer screening

## 2023-11-15 NOTE — PROGRESS NOTES
Chief Complaint  Depression, Anxiety, and Dizziness (1 week)    HPI:    Queta Cuadra presents to [facility]    Patient previously seen to establish care on 10/10/2023 and reported increased stress and anxiety.  She was under increased stress due to multiple stressors including his mom's health and father-in-law's health.  After discussion of both pharmacological and nonpharmacological treatments, patient preferred to monitor for now and consider medication/therapy at follow-up visit.    Patient overall feeling that her life is a roller coaster and has some days that are better than other as she is caregiver for both her mom and father-in-law. The parents lyssa is better which improves some of her mood. Sleep and appetite okay. Denies suicidal/homicidal ideations    Most recent hemoglobin A1c was 5.9. She is nervous due to family history of diabetes. Denies polyuria, polydipsia. She is trying to make diet and activity changes as  also had cardiac issues. She is planning on getting gym membership with him.     Review of Systems:  ROS negative unless otherwise noted in HPI above.    Past Medical History:   Diagnosis Date    History of seasonal allergies          Current Outpatient Medications:     acetaminophen (TYLENOL) 500 MG tablet, Take 1 tablet by mouth Every 6 (Six) Hours As Needed for Fever, Headache, Mild Pain or Moderate Pain., Disp: , Rfl:     meclizine (ANTIVERT) 25 MG tablet, Take 1 tablet by mouth 3 (Three) Times a Day As Needed for Dizziness or Nausea., Disp: 30 tablet, Rfl: 0    ondansetron (ZOFRAN) 4 MG tablet, Take 1 tablet by mouth Every 8 (Eight) Hours As Needed for Nausea or Vomiting., Disp: , Rfl:     SUMAtriptan (Imitrex) 50 MG tablet, Take 1 tablet by mouth Every 2 (Two) Hours As Needed for Migraine. Take one tablet at onset of headache. May repeat dose one time in 2 hours if headache not relieved. Do not take more than 200mg in 24 hours, Disp: 30  "tablet, Rfl: 1    Social History     Socioeconomic History    Marital status:    Tobacco Use    Smoking status: Former     Packs/day: 0.25     Years: 3.00     Additional pack years: 0.00     Total pack years: 0.75     Types: Cigarettes     Quit date:      Years since quittin.8     Passive exposure: Past    Smokeless tobacco: Never   Vaping Use    Vaping Use: Never used   Substance and Sexual Activity    Alcohol use: Yes     Alcohol/week: 2.0 standard drinks of alcohol     Types: 2 Cans of beer per week     Comment: rarely    Drug use: Defer    Sexual activity: Defer        Objective   Vital Signs:  /78   Pulse 93   Resp 17   Ht 154.9 cm (61\")   Wt 77 kg (169 lb 12.8 oz)   SpO2 98%   BMI 32.08 kg/m²   Estimated body mass index is 32.08 kg/m² as calculated from the following:    Height as of this encounter: 154.9 cm (61\").    Weight as of this encounter: 77 kg (169 lb 12.8 oz).    Physical Exam:  General: Well-appearing patient, no apparent distress  HEENT: No posterior pharynx erythema, no tonsillar erythema or exudates  Cardiac: Regular rate and rhythm, normal S1/S2, no murmur, rubs or gallops, no lower extremity edema  Lungs: Clear to auscultation bilaterally, no crackles or wheezes  Skin: No significant rashes or lesions  MSK: Grossly normal tone and strength  Neuro: Alert and oriented x3, CN II-XII grossly intact  Psych: Appropriate mood and affect    Assessment and Plan:    (F41.8) Depression with anxiety  Assessment: Mood overall slightly improved with recent improved health of mother and father-in-law.  Discussed possible pharmacological and nonpharmacological options and will continue to pursue nonpharmacological options for now.  Plan:  - Hold on medications for now  - Encouraged patient to do things they enjoy, reduce stressors    (R73.03) Prediabetes   Assessment: Hemoglobin A1c 5.9 during recent establish care visit.  Lengthy discussion about currently being in prediabetic " range and increased risk of eventually progressing to diabetes especially considering family history.  Discussed the importance of healthy diet and exercise to promote weight loss.  Plan:  - Encouraged healthy diet and exercise   - Intermittently monitor A1c in future    Patient planning to follow-up in approximately 3 months for routine preventative care visit.    Patient was given instructions and counseling regarding her condition or for health maintenance advice. Please see specific information pulled into the AVS if appropriate.       Dr Neo Yuen   Internal Medicine Physician  [facility]  [address], [address]  [place] [address]

## 2024-02-15 ENCOUNTER — OFFICE VISIT (OUTPATIENT)
Dept: FAMILY MEDICINE CLINIC | Facility: CLINIC | Age: 49
End: 2024-02-15
Payer: COMMERCIAL

## 2024-02-15 VITALS
SYSTOLIC BLOOD PRESSURE: 118 MMHG | RESPIRATION RATE: 16 BRPM | BODY MASS INDEX: 32.21 KG/M2 | WEIGHT: 170.6 LBS | OXYGEN SATURATION: 97 % | HEIGHT: 61 IN | DIASTOLIC BLOOD PRESSURE: 78 MMHG | HEART RATE: 87 BPM

## 2024-02-15 DIAGNOSIS — Z00.00 PREVENTATIVE HEALTH CARE: Primary | ICD-10-CM

## 2024-02-15 DIAGNOSIS — Z12.11 ENCOUNTER FOR SCREENING FOR MALIGNANT NEOPLASM OF COLON: ICD-10-CM

## 2024-02-15 DIAGNOSIS — Z11.59 NEED FOR HEPATITIS C SCREENING TEST: ICD-10-CM

## 2024-02-15 DIAGNOSIS — Z13.1 DIABETES MELLITUS SCREENING: ICD-10-CM

## 2024-02-15 DIAGNOSIS — R03.0 ELEVATED BLOOD PRESSURE READING: ICD-10-CM

## 2024-02-15 DIAGNOSIS — R73.03 PREDIABETES: ICD-10-CM

## 2024-02-15 DIAGNOSIS — Z12.31 ENCOUNTER FOR SCREENING MAMMOGRAM FOR MALIGNANT NEOPLASM OF BREAST: ICD-10-CM

## 2024-02-15 PROCEDURE — 83036 HEMOGLOBIN GLYCOSYLATED A1C: CPT | Performed by: INTERNAL MEDICINE

## 2024-02-15 PROCEDURE — 36415 COLL VENOUS BLD VENIPUNCTURE: CPT | Performed by: INTERNAL MEDICINE

## 2024-02-15 PROCEDURE — 86803 HEPATITIS C AB TEST: CPT | Performed by: INTERNAL MEDICINE

## 2024-02-16 LAB
HBA1C MFR BLD: 5.7 % (ref 4.8–5.6)
HCV AB SER DONR QL: NORMAL

## 2024-02-23 ENCOUNTER — HOSPITAL ENCOUNTER (OUTPATIENT)
Dept: MAMMOGRAPHY | Facility: HOSPITAL | Age: 49
Discharge: HOME | End: 2024-02-23
Admitting: INTERNAL MEDICINE
Payer: COMMERCIAL

## 2024-02-23 DIAGNOSIS — Z12.31 ENCOUNTER FOR SCREENING MAMMOGRAM FOR MALIGNANT NEOPLASM OF BREAST: ICD-10-CM

## 2024-02-23 PROCEDURE — 77063 BREAST TOMOSYNTHESIS BI: CPT

## 2024-02-23 PROCEDURE — 77067 SCR MAMMO BI INCL CAD: CPT

## 2024-02-27 ENCOUNTER — TELEPHONE (OUTPATIENT)
Dept: FAMILY MEDICINE CLINIC | Facility: CLINIC | Age: 49
End: 2024-02-27
Payer: COMMERCIAL

## 2024-02-27 NOTE — TELEPHONE ENCOUNTER
----- Message from Neo Yuen MD sent at 2/27/2024  7:59 AM EST -----  Please let patient know that her mammogram overall was recently good.  It did not show any evidence of concerning findings.  Repeat screening mammogram recommended in 1 year.    Neo Yuen MD

## 2024-03-29 ENCOUNTER — ON CAMPUS - OUTPATIENT (OUTPATIENT)
Dept: URBAN - METROPOLITAN AREA HOSPITAL 2 | Facility: HOSPITAL | Age: 49
End: 2024-03-29
Payer: COMMERCIAL

## 2024-03-29 VITALS
OXYGEN SATURATION: 100 % | HEART RATE: 70 BPM | RESPIRATION RATE: 20 BRPM | RESPIRATION RATE: 16 BRPM | SYSTOLIC BLOOD PRESSURE: 126 MMHG | SYSTOLIC BLOOD PRESSURE: 121 MMHG | OXYGEN SATURATION: 94 % | DIASTOLIC BLOOD PRESSURE: 71 MMHG | SYSTOLIC BLOOD PRESSURE: 141 MMHG | DIASTOLIC BLOOD PRESSURE: 75 MMHG | DIASTOLIC BLOOD PRESSURE: 81 MMHG | HEART RATE: 78 BPM | HEART RATE: 85 BPM | DIASTOLIC BLOOD PRESSURE: 84 MMHG | OXYGEN SATURATION: 99 % | HEART RATE: 72 BPM | HEART RATE: 79 BPM | RESPIRATION RATE: 17 BRPM | SYSTOLIC BLOOD PRESSURE: 133 MMHG | OXYGEN SATURATION: 96 % | TEMPERATURE: 97.5 F | DIASTOLIC BLOOD PRESSURE: 85 MMHG | SYSTOLIC BLOOD PRESSURE: 103 MMHG | DIASTOLIC BLOOD PRESSURE: 72 MMHG | DIASTOLIC BLOOD PRESSURE: 67 MMHG | DIASTOLIC BLOOD PRESSURE: 64 MMHG | SYSTOLIC BLOOD PRESSURE: 114 MMHG | OXYGEN SATURATION: 98 % | HEART RATE: 68 BPM | HEART RATE: 74 BPM | OXYGEN SATURATION: 97 % | DIASTOLIC BLOOD PRESSURE: 96 MMHG | SYSTOLIC BLOOD PRESSURE: 125 MMHG | WEIGHT: 168 LBS | HEART RATE: 80 BPM | SYSTOLIC BLOOD PRESSURE: 139 MMHG | HEART RATE: 66 BPM | HEIGHT: 61 IN | RESPIRATION RATE: 19 BRPM

## 2024-03-29 DIAGNOSIS — K64.1 SECOND DEGREE HEMORRHOIDS: ICD-10-CM

## 2024-03-29 DIAGNOSIS — Z12.11 ENCOUNTER FOR SCREENING FOR MALIGNANT NEOPLASM OF COLON: ICD-10-CM

## 2024-03-29 DIAGNOSIS — K57.30 DIVERTICULOSIS OF LARGE INTESTINE WITHOUT PERFORATION OR ABS: ICD-10-CM

## 2024-03-29 PROCEDURE — G0121 COLON CA SCRN NOT HI RSK IND: HCPCS | Performed by: INTERNAL MEDICINE

## 2024-03-29 RX ADMIN — HYOSCYAMINE SULFATE 0.12 MG: 0.12 TABLET ORAL at 12:09

## 2024-03-29 RX ADMIN — ONDANSETRON HYDROCHLORIDE 4 MG: 4 SOLUTION ORAL at 11:03

## 2024-06-10 ENCOUNTER — TELEPHONE (OUTPATIENT)
Dept: FAMILY MEDICINE CLINIC | Facility: CLINIC | Age: 49
End: 2024-06-10
Payer: COMMERCIAL

## 2024-06-11 NOTE — TELEPHONE ENCOUNTER
Gave message to patient at 9:15am. Patient said all of the swelling has gone down and she is doing fine now. If it does come back, [place] [facility] [place]  
[place] to [place] on Sunday. Her legs are really swollen from her feet to her knees and they are painful in her feet and calves. She has elevated her feet and that helps some. Said it even hurts to walk. I recommended the [facility] or ER near her in [place] but patient was reluctant. I told her you were not in the office today and she wants me to leave this message for you for when you get here Tuesday to see what you say. She asked if you would do a video visit with her.  
We are not able to do out-of-state telemedicine visits.  I do not have a medical license in [place] and  am not able to practice medicine for this chief complaint appropriately via telemedicine.  Agree with assessment that patient should be seen and urgent care or emergency department.    Neo Yuen MD   
25 yo F here for vaginal bleeding with + home pregnancy test. otherwise no complaints. PLAN: labs, urine, TVUS, HCG

## 2025-02-28 ENCOUNTER — TELEPHONE (OUTPATIENT)
Dept: FAMILY MEDICINE CLINIC | Facility: CLINIC | Age: 50
End: 2025-02-28
Payer: COMMERCIAL

## 2025-03-14 ENCOUNTER — OFFICE VISIT (OUTPATIENT)
Dept: FAMILY MEDICINE CLINIC | Facility: CLINIC | Age: 50
End: 2025-03-14
Payer: COMMERCIAL

## 2025-03-14 ENCOUNTER — LAB (OUTPATIENT)
Dept: FAMILY MEDICINE CLINIC | Facility: CLINIC | Age: 50
End: 2025-03-14
Payer: COMMERCIAL

## 2025-03-14 VITALS
WEIGHT: 171.6 LBS | RESPIRATION RATE: 18 BRPM | OXYGEN SATURATION: 97 % | SYSTOLIC BLOOD PRESSURE: 122 MMHG | BODY MASS INDEX: 32.4 KG/M2 | HEIGHT: 61 IN | DIASTOLIC BLOOD PRESSURE: 84 MMHG | HEART RATE: 86 BPM

## 2025-03-14 DIAGNOSIS — R03.0 ELEVATED BLOOD PRESSURE READING: ICD-10-CM

## 2025-03-14 DIAGNOSIS — E78.5 HYPERLIPIDEMIA, UNSPECIFIED HYPERLIPIDEMIA TYPE: ICD-10-CM

## 2025-03-14 DIAGNOSIS — F41.8 DEPRESSION WITH ANXIETY: ICD-10-CM

## 2025-03-14 DIAGNOSIS — R73.03 PREDIABETES: ICD-10-CM

## 2025-03-14 DIAGNOSIS — R53.83 FATIGUE, UNSPECIFIED TYPE: ICD-10-CM

## 2025-03-14 DIAGNOSIS — M79.602 LEFT ARM PAIN: ICD-10-CM

## 2025-03-14 DIAGNOSIS — Z12.31 ENCOUNTER FOR SCREENING MAMMOGRAM FOR MALIGNANT NEOPLASM OF BREAST: ICD-10-CM

## 2025-03-14 DIAGNOSIS — Z00.00 PREVENTATIVE HEALTH CARE: Primary | ICD-10-CM

## 2025-03-14 LAB
ANION GAP SERPL CALCULATED.3IONS-SCNC: 10.3 MMOL/L (ref 5–15)
BASOPHILS # BLD AUTO: 0.04 10*3/MM3 (ref 0–0.2)
BASOPHILS NFR BLD AUTO: 0.7 % (ref 0–1.5)
BUN SERPL-MCNC: 16 MG/DL (ref 6–20)
BUN/CREAT SERPL: 18.6 (ref 7–25)
CALCIUM SPEC-SCNC: 9.5 MG/DL (ref 8.6–10.5)
CHLORIDE SERPL-SCNC: 103 MMOL/L (ref 98–107)
CHOLEST SERPL-MCNC: 190 MG/DL (ref 0–200)
CO2 SERPL-SCNC: 27.7 MMOL/L (ref 22–29)
CREAT SERPL-MCNC: 0.86 MG/DL (ref 0.57–1)
DEPRECATED RDW RBC AUTO: 38.3 FL (ref 37–54)
EGFRCR SERPLBLD CKD-EPI 2021: 82.4 ML/MIN/1.73
EOSINOPHIL # BLD AUTO: 0.14 10*3/MM3 (ref 0–0.4)
EOSINOPHIL NFR BLD AUTO: 2.3 % (ref 0.3–6.2)
ERYTHROCYTE [DISTWIDTH] IN BLOOD BY AUTOMATED COUNT: 12.6 % (ref 12.3–15.4)
GLUCOSE SERPL-MCNC: 101 MG/DL (ref 65–99)
HCT VFR BLD AUTO: 40.3 % (ref 34–46.6)
HDLC SERPL-MCNC: 44 MG/DL (ref 40–60)
HGB BLD-MCNC: 13.1 G/DL (ref 12–15.9)
IMM GRANULOCYTES # BLD AUTO: 0.02 10*3/MM3 (ref 0–0.05)
IMM GRANULOCYTES NFR BLD AUTO: 0.3 % (ref 0–0.5)
LDLC SERPL CALC-MCNC: 122 MG/DL (ref 0–100)
LDLC/HDLC SERPL: 2.71 {RATIO}
LYMPHOCYTES # BLD AUTO: 2.13 10*3/MM3 (ref 0.7–3.1)
LYMPHOCYTES NFR BLD AUTO: 35.2 % (ref 19.6–45.3)
MAGNESIUM SERPL-MCNC: 2.4 MG/DL (ref 1.6–2.6)
MCH RBC QN AUTO: 27.2 PG (ref 26.6–33)
MCHC RBC AUTO-ENTMCNC: 32.5 G/DL (ref 31.5–35.7)
MCV RBC AUTO: 83.6 FL (ref 79–97)
MONOCYTES # BLD AUTO: 0.34 10*3/MM3 (ref 0.1–0.9)
MONOCYTES NFR BLD AUTO: 5.6 % (ref 5–12)
NEUTROPHILS NFR BLD AUTO: 3.38 10*3/MM3 (ref 1.7–7)
NEUTROPHILS NFR BLD AUTO: 55.9 % (ref 42.7–76)
NRBC BLD AUTO-RTO: 0 /100 WBC (ref 0–0.2)
PLATELET # BLD AUTO: 306 10*3/MM3 (ref 140–450)
PMV BLD AUTO: 9.1 FL (ref 6–12)
POTASSIUM SERPL-SCNC: 4.6 MMOL/L (ref 3.5–5.2)
RBC # BLD AUTO: 4.82 10*6/MM3 (ref 3.77–5.28)
SODIUM SERPL-SCNC: 141 MMOL/L (ref 136–145)
TRIGL SERPL-MCNC: 133 MG/DL (ref 0–150)
TSH SERPL DL<=0.05 MIU/L-ACNC: 3.9 UIU/ML (ref 0.27–4.2)
VLDLC SERPL-MCNC: 24 MG/DL (ref 5–40)
WBC NRBC COR # BLD AUTO: 6.05 10*3/MM3 (ref 3.4–10.8)

## 2025-03-14 PROCEDURE — 83735 ASSAY OF MAGNESIUM: CPT | Performed by: INTERNAL MEDICINE

## 2025-03-14 PROCEDURE — 85025 COMPLETE CBC W/AUTO DIFF WBC: CPT | Performed by: INTERNAL MEDICINE

## 2025-03-14 PROCEDURE — 84443 ASSAY THYROID STIM HORMONE: CPT | Performed by: INTERNAL MEDICINE

## 2025-03-14 PROCEDURE — 80048 BASIC METABOLIC PNL TOTAL CA: CPT | Performed by: INTERNAL MEDICINE

## 2025-03-14 PROCEDURE — 80061 LIPID PANEL: CPT | Performed by: INTERNAL MEDICINE

## 2025-03-14 PROCEDURE — 36415 COLL VENOUS BLD VENIPUNCTURE: CPT | Performed by: INTERNAL MEDICINE

## 2025-03-14 RX ORDER — CITALOPRAM HYDROBROMIDE 10 MG/1
10 TABLET ORAL DAILY
Qty: 30 TABLET | Refills: 1 | Status: SHIPPED | OUTPATIENT
Start: 2025-03-14

## 2025-03-14 NOTE — PATIENT INSTRUCTIONS
Please stop at lab on second floor to have blood drawn    Declines vaccines; due for multiple vaccines including flu, COVID, shingles, tetanus and pneumonia    Medications:  Continue medications as prescribed  Start celexa 10 mg daily    Would recommend follow up with therapist    Mammogram as scheduled    Follow up with ortho for left arm/elbow    Encourage healthy diet and exercise    Follow up in one month for medication recheck

## 2025-03-14 NOTE — PROGRESS NOTES
Chief Complaint  Annual Exam    HPI:    Queta Cuadra presents to [facility]    Patient is a 50-year-old female presenting for annual preventative care visit.    Patient previously seen for depression and anxiety related to health issues of mother and father-in-law and being their caregivers.  Previously discussed pharmacological and nonpharmacological treatment options for which patient preferred to pursue nonpharmacological options.  Recent mood down due to multiple family stressors.  She also recently lost a really close friend.  Sleep and appetite down.  Patient interested in pursuing medication and therapy.     Elevated blood pressure  Home blood pressures overall has been good. Sometimes elevated with anxiety. Not currently on antihypertensive medications.  Denies headaches, blurry vision, dizziness, chest pain, shortness of breath, or palpitations.  Diet and exercise overall could be better.     Prediabetes  Most recent hemoglobin A1c was 5.7 on 2/15/2024.  Diet and exercise overall could be better.    Patient reports that she has been having left arm pain near the left elbow that has been ongoing the last several months. Pain described as an intermittent, non-radiating, sharp pain that is worse with activities and improves with rest. Pain worse with activity and nothing really improves Patient has been trying OTC medications, activity modifications, heat/ice. Denies massage, stretching, PT. Denies fever, chills, nausea, vomiting. Denies numbness, tingling, weakness, saddle anesthesia, urinary/fecal incontinence, focal sensory/motor deficit. Denies recent trauma, bending/twisting, injury, or overuse. No prior imaging.    Migraines  Currently prescribed Imitrex as needed.  Headaches generally well-controlled with Mytrex as needed.  Denies new or worsening headache symptoms.     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  Notable for internal and external hemorrhoids and diverticulosis of sigmoid colon.  Repeat recommended 10 years ().   Mammogram: Most recent mammogram 2024.  Due for repeat.  Pap/HPV: Prior complete hysterectomy due to ovarian cyst and endometriosis    Immunizations: Declines vaccines; due for multiple vaccines including flu, COVID, shingles, tetanus and pneumonia    Review of Systems:  ROS negative unless otherwise noted in HPI above.    Past Medical History:   Diagnosis Date    History of seasonal allergies          Current Outpatient Medications:     acetaminophen (TYLENOL) 500 MG tablet, Take 1 tablet by mouth Every 6 (Six) Hours As Needed for Fever, Headache, Mild Pain or Moderate Pain., Disp: , Rfl:     meclizine (ANTIVERT) 25 MG tablet, Take 1 tablet by mouth 3 (Three) Times a Day As Needed for Dizziness or Nausea., Disp: 30 tablet, Rfl: 0    ondansetron (ZOFRAN) 4 MG tablet, Take 1 tablet by mouth Every 8 (Eight) Hours As Needed for Nausea or Vomiting., Disp: , Rfl:     SUMAtriptan (Imitrex) 50 MG tablet, Take 1 tablet by mouth Every 2 (Two) Hours As Needed for Migraine. Take one tablet at onset of headache. May repeat dose one time in 2 hours if headache not relieved. Do not take more than 200mg in 24 hours, Disp: 30 tablet, Rfl: 1    Social History     Socioeconomic History    Marital status:    Tobacco Use    Smoking status: Former     Current packs/day: 0.00     Average packs/day: 0.3 packs/day for 3.0 years (0.8 ttl pk-yrs)     Types: Cigarettes     Start date:      Quit date:      Years since quittin.2     Passive exposure: Past    Smokeless tobacco: Never   Vaping Use    Vaping status: Never Used   Substance and Sexual Activity    Alcohol use: Yes     Alcohol/week:  "2.0 standard drinks of alcohol     Types: 2 Cans of beer per week     Comment: rarely    Drug use: Defer    Sexual activity: Defer        Objective   Vital Signs:  Resp 18   Ht 154.9 cm (61\")   Wt 77.8 kg (171 lb 9.6 oz)   BMI 32.42 kg/m²   Estimated body mass index is 32.42 kg/m² as calculated from the following:    Height as of this encounter: 154.9 cm (61\").    Weight as of this encounter: 77.8 kg (171 lb 9.6 oz).    Physical Exam:  General: Well-appearing patient, no apparent distress  HEENT: No posterior pharynx erythema, no tonsillar erythema or exudates, normal external auditory canals, TM normal without bulging or erythema  Neck: No cervical lymphadenopathy  Cardiac: Regular rate and rhythm, normal S1/S2, no murmur, rubs or gallops, no lower extremity edema  Lungs: Clear to auscultation bilaterally, no crackles or wheezes  Abdomen: Soft, non-tender, no guarding or rebound tenderness, no hepatosplenomegaly  Skin: No significant rashes or lesions  MSK: Grossly normal tone and strength  Neuro: Alert and oriented x3, CN II-XII grossly intact  Psych: Appropriate mood and affect    Assessment and Plan:    (Z00.00) Preventative health care -  Patient is a 50 year old who is overall doing well. Reviewed social and family history. Encouraged increased healthy diet and exercise and discussed importance to overall health.  Updating age and gender appropriate cancer screenings with mammogram.  Discussed indicated vaccines based on age and comorbidities. No skin concerns.  Mood as below.  Plan:  - Encourage healthy diet and exercise  - Up date vaccines, if necessary  - Screening labs as ordered  - Update cancer screening as below  - Advanced health care directive     (F41.8) Depression with anxiety  Assessment: Patient with depression and anxiety due to multiple ongoing stressors.  Patient feels overwhelmed by the amount of things on her plate.  Discussed pharmacological and nonpharmacological treatment options.  " After discussion, patient receptive to starting Celexa, which her daughter is also on.  Discussed adjunct therapies including therapist, doing things that she enjoys.  Plan:  - Start Celexa 10 mg daily  - Encouraged patient to do things they enjoy, reduce stressors  - Follow-up with therapist  - Follow-up in 1 month for dose adjustment consideration    (R03.0) Elevated blood pressure reading - Plan: Basic metabolic panel, CBC & Differential    (E78.5) Hyperlipidemia, unspecified hyperlipidemia type   Assessment: Not currently on statin.  Discussed importance of healthy diet and exercise.  Plan:  - Fasting lipid panel  - Consider statin based on lipids and 10-year ASCVD risk  - Discussed healthy diet and lifestyle     (R73.03) Prediabetes  Assessment: Previously noted on routine screening labs.  Discussed importance of healthy diet and exercise.  Plan:  - Fasting glucose  - Encouraged healthy diet and exercise    (M79.602) Left arm pain -   Assessment: Patient with significant pain and reported subjective weakness of the lateral proximal forearm near elbow.  No clear trauma or injury, although suspect possible ligamentous injury.    Plan:   - Ambulatory Referral to Orthopedic Surgery  - OTC analgesia at as needed  - Activity modification  - Imaging per Ortho    (R53.83) Fatigue, unspecified type -  Assessment: Most likely secondary to increased stressors, anxiety, and lack of sleep.  Proceeding with basic laboratory workup to rule out additional causes.  Plan:   - BMP, CBC, Magnesium, TSH  - Mood treatment as above  - Consider additional workup pending labs    Migraines  Assessment: Migraines overall well-controlled on current regimen of Imitrex 50 mg as needed.  No new or worsening symptoms to warrant additional workup or imaging.  Plan:   - Continue Imitrex as prescribed    (Z12.31) Encounter for screening mammogram for malignant neoplasm of breast  Plan: Mammo Screening Digital Tomosynthesis Bilateral With  CAD    BMI is >= 30 and <35. (Class 1 Obesity). The following options were offered after discussion;: Encouraged healthy diet and exercise      Patient was given instructions and counseling regarding her condition or for health maintenance advice. Please see specific information pulled into the AVS if appropriate.       Dr Neo Yuen   Internal Medicine Physician  [facility]--[address], [address]  [place] [address]

## 2025-03-18 ENCOUNTER — RESULTS FOLLOW-UP (OUTPATIENT)
Dept: FAMILY MEDICINE CLINIC | Facility: CLINIC | Age: 50
End: 2025-03-18
Payer: COMMERCIAL

## 2025-03-31 LAB
NCCN CRITERIA FLAG: ABNORMAL
TYRER CUZICK SCORE: 3.6

## 2025-04-01 ENCOUNTER — RESULTS FOLLOW-UP (OUTPATIENT)
Facility: HOSPITAL | Age: 50
End: 2025-04-01
Payer: COMMERCIAL

## 2025-04-01 ENCOUNTER — HOSPITAL ENCOUNTER (OUTPATIENT)
Dept: MAMMOGRAPHY | Facility: HOSPITAL | Age: 50
Discharge: HOME | End: 2025-04-01
Admitting: INTERNAL MEDICINE
Payer: COMMERCIAL

## 2025-04-01 DIAGNOSIS — Z12.31 ENCOUNTER FOR SCREENING MAMMOGRAM FOR MALIGNANT NEOPLASM OF BREAST: ICD-10-CM

## 2025-04-01 PROCEDURE — 77067 SCR MAMMO BI INCL CAD: CPT

## 2025-04-01 PROCEDURE — 77063 BREAST TOMOSYNTHESIS BI: CPT

## 2025-04-02 ENCOUNTER — DOCUMENTATION (OUTPATIENT)
Dept: GENETICS | Facility: HOSPITAL | Age: 50
End: 2025-04-02
Payer: COMMERCIAL

## 2025-04-02 NOTE — PROGRESS NOTES
This patient recently completed the CARE risk assessment for a mammogram appointment. Based on the patient's responses, NCCN criteria for genetic testing was met. At the time of the assessment, the patient was provided with both written and video educational materials regarding genetic testing.    Navigator follow-up:   I spoke with the patient regarding the risk assessment results and our genetic testing program.  She declined genetic testing at this time, but will contact me if she decides to proceed.

## 2025-04-04 ENCOUNTER — HOSPITAL ENCOUNTER (OUTPATIENT)
Dept: ULTRASOUND IMAGING | Facility: HOSPITAL | Age: 50
Discharge: HOME | End: 2025-04-04
Payer: COMMERCIAL

## 2025-04-04 ENCOUNTER — RESULTS FOLLOW-UP (OUTPATIENT)
Dept: MAMMOGRAPHY | Facility: HOSPITAL | Age: 50
End: 2025-04-04
Payer: COMMERCIAL

## 2025-04-04 ENCOUNTER — HOSPITAL ENCOUNTER (OUTPATIENT)
Dept: MAMMOGRAPHY | Facility: HOSPITAL | Age: 50
Discharge: HOME | End: 2025-04-04
Payer: COMMERCIAL

## 2025-04-04 DIAGNOSIS — N64.89 BREAST ASYMMETRY: ICD-10-CM

## 2025-04-04 PROCEDURE — 77065 DX MAMMO INCL CAD UNI: CPT

## 2025-04-04 PROCEDURE — G0279 TOMOSYNTHESIS, MAMMO: HCPCS

## 2025-04-04 PROCEDURE — 76642 ULTRASOUND BREAST LIMITED: CPT

## 2025-04-07 NOTE — TELEPHONE ENCOUNTER
I spoke with patient about mammogram results and that a repeat in six months is recommended. She expressed understanding.

## 2025-05-09 ENCOUNTER — OFFICE VISIT (OUTPATIENT)
Dept: ORTHOPEDIC SURGERY | Facility: CLINIC | Age: 50
End: 2025-05-09
Payer: COMMERCIAL

## 2025-05-09 VITALS — OXYGEN SATURATION: 96 % | BODY MASS INDEX: 32.47 KG/M2 | HEIGHT: 61 IN | HEART RATE: 76 BPM | WEIGHT: 172 LBS

## 2025-05-09 DIAGNOSIS — M77.12 LATERAL EPICONDYLITIS OF LEFT ELBOW: ICD-10-CM

## 2025-05-09 DIAGNOSIS — M54.12 CERVICAL RADICULOPATHY: Primary | ICD-10-CM

## 2025-05-09 PROCEDURE — 99203 OFFICE O/P NEW LOW 30 MIN: CPT | Performed by: FAMILY MEDICINE

## 2025-05-09 RX ORDER — MELOXICAM 15 MG/1
15 TABLET ORAL DAILY
Qty: 30 TABLET | Refills: 0 | Status: SHIPPED | OUTPATIENT
Start: 2025-05-09

## 2025-05-09 RX ORDER — CITALOPRAM HYDROBROMIDE 10 MG/1
10 TABLET ORAL DAILY
Qty: 30 TABLET | Refills: 1 | Status: SHIPPED | OUTPATIENT
Start: 2025-05-09

## 2025-05-09 NOTE — PROGRESS NOTES
Primary Care Sports Medicine Office Visit Note    Patient ID: Queta Cuadra is a 50 y.o. female.    Chief Complaint:  Chief Complaint   Patient presents with    Left Elbow - Pain, Initial Evaluation    Left Shoulder - Pain, Initial Evaluation       History of Present Illness  The patient presents for evaluation of shoulder and elbow pain.    She has been experiencing persistent pain in her arm for several months, which has progressively worsened. The pain radiates from the elbow to the shoulder and occasionally extends to the top of the hand and pinky finger. Severe pain occurs upon lifting the arm in certain positions, particularly in the mornings when it is difficult to lift due to the intensity of the discomfort. Significant pain is also reported when stretching the arm. There is no recollection of any specific injury or fall that could have precipitated these symptoms. 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 No radiographic imaging of the elbow or shoulder has been done. Her primary care physician, Dr. Kessler, recommended an orthopedic consultation and potential MRI. The pain is described as a burning sensation, akin to feeling on fire, which is particularly pronounced at night. Lifting objects such as a pan of water or milk is difficult due to the pain. Additionally, numbness in the pinky, ring finger, and thumb was noticed this morning. She is right-handed and typically drives with her right hand.       Past Medical History:   Diagnosis Date    Cyst (solitary) of breast, right 04/2024    History of seasonal allergies        Past Surgical History:   Procedure Laterality Date    EAR TUBES      HYSTERECTOMY      full    TONSILLECTOMY         Family History   Adopted: Yes   Problem Relation Age of Onset    Pancreatic cancer Mother     Hypertension Mother     No Known Problems Father     Ovarian  "cancer Sister     No Known Problems Brother     No Known Problems Maternal Grandmother     Stomach cancer Maternal Grandfather     No Known Problems Paternal Grandmother     No Known Problems Paternal Grandfather     No Known Problems Daughter     No Known Problems Son     No Known Problems Cousin     No Known Problems Other     Rheum arthritis Neg Hx     Osteoarthritis Neg Hx     Asthma Neg Hx     Diabetes Neg Hx     Heart failure Neg Hx     Hyperlipidemia Neg Hx     Migraines Neg Hx     Rashes / Skin problems Neg Hx     Seizures Neg Hx     Stroke Neg Hx     Thyroid disease Neg Hx      Social History     Occupational History    Not on file   Tobacco Use    Smoking status: Former     Current packs/day: 0.00     Average packs/day: 0.3 packs/day for 3.0 years (0.8 ttl pk-yrs)     Types: Cigarettes     Start date:      Quit date:      Years since quittin.3     Passive exposure: Past    Smokeless tobacco: Never   Vaping Use    Vaping status: Never Used   Substance and Sexual Activity    Alcohol use: Yes     Alcohol/week: 2.0 standard drinks of alcohol     Types: 2 Cans of beer per week     Comment: rarely    Drug use: Defer    Sexual activity: Defer        Review of Systems:  Review of Systems   Constitutional:  Negative for activity change, fatigue and fever.   Musculoskeletal:  Positive for arthralgias.   Skin:  Negative for color change and rash.   Neurological:  Negative for numbness.     Objective:  Physical Exam  Pulse 76   Ht 154.9 cm (61\")   Wt 78 kg (172 lb)   SpO2 96%   BMI 32.50 kg/m²   Vitals and nursing note reviewed.   Constitutional:       General: she  is not in acute distress.     Appearance: she is well-developed. she is not diaphoretic.   HENT:      Head: Normocephalic and atraumatic.   Eyes:      Conjunctiva/sclera: Conjunctivae normal.   Pulmonary:      Effort: Pulmonary effort is normal. No respiratory distress.   Skin:     General: Skin is warm.      Capillary Refill: Capillary " "refill takes less than 2 seconds.   Neurological:      Mental Status: she is alert.     Ortho Exam:  Physical Exam  Neck: Supple, no abnormalities  Musculoskeletal: Left elbow shows a full range of motion from 0 to 130 degrees with considerable pain upon full extension. Resisted wrist supination and resisted middle finger extension both cause exquisite pain to the lateral epicondyle and the musculature just distal to it. This area is also tender to palpation. Strength is 5/5 for both flexion and extension of the elbow.  Other: Spurling's maneuver to the left is positive. The cervical spine has a full range of motion in all directions with no tenderness upon palpation.    Results  XR of the cervical sp pending.     Assessment & Plan  1. Lateral epicondylitis of the left elbow.  - The patient reports pain in the left elbow that radiates to the shoulder and down to the hand, with significant discomfort during certain movements and lifting activities.  - Physical examination reveals tenderness and pain with resisted wrist supination and middle finger extension, consistent with lateral epicondylitis.  - A tennis elbow strap will be provided to alleviate pressure on the affected area.  - An anti-inflammatory medication will be prescribed to reduce inflammation. She is advised to avoid activities that exacerbate the pain.    2. Cervical radiculopathy.  - The patient experiences burning pain and numbness in the left arm, particularly in the pinky, ring finger, and thumb, which may indicate nerve irritation originating from the neck.  - Physical examination shows a positive Spurling's maneuver and full cervical range of motion.  - An x-ray of the neck will be ordered to rule out any structural abnormalities.  - If the x-ray is normal, an MRI of the neck will be ordered to further investigate the cause of the symptoms.    Bernard BECKWITH \"Chance\" Evin CAMPOS DO, CAQSM  05/09/25  10:28 EDT    Disclaimer: Please note that areas of " this note were completed with computer voice recognition software.  Quite often unanticipated grammatical, syntax, homophones, and other interpretive errors are inadvertently transcribed by the computer software. Please excuse any errors that have escaped final proofreading.    Patient or patient representative verbalized consent for the use of Ambient Listening during the visit with  Bernard Cleary II, DO for chart documentation. 10:28 EDT 05/09/25

## 2025-06-09 ENCOUNTER — HOSPITAL ENCOUNTER (OUTPATIENT)
Dept: MRI IMAGING | Facility: HOSPITAL | Age: 50
Discharge: HOME | End: 2025-06-09
Admitting: FAMILY MEDICINE
Payer: COMMERCIAL

## 2025-06-09 DIAGNOSIS — M54.12 CERVICAL RADICULOPATHY: ICD-10-CM

## 2025-06-09 DIAGNOSIS — M77.12 LATERAL EPICONDYLITIS OF LEFT ELBOW: ICD-10-CM

## 2025-06-09 PROCEDURE — 72141 MRI NECK SPINE W/O DYE: CPT

## 2025-06-09 RX ORDER — MELOXICAM 15 MG/1
15 TABLET ORAL DAILY
Qty: 30 TABLET | Refills: 0 | Status: SHIPPED | OUTPATIENT
Start: 2025-06-09

## 2025-06-09 NOTE — TELEPHONE ENCOUNTER
Meloxicam RF   Last RF 5/9/25  Last OV 5/9/25    Patient does not have any scheduled appts at this time

## 2025-06-12 ENCOUNTER — OFFICE VISIT (OUTPATIENT)
Dept: ORTHOPEDIC SURGERY | Facility: CLINIC | Age: 50
End: 2025-06-12
Payer: COMMERCIAL

## 2025-06-12 VITALS — OXYGEN SATURATION: 94 % | WEIGHT: 160 LBS | HEART RATE: 98 BPM | BODY MASS INDEX: 30.21 KG/M2 | HEIGHT: 61 IN

## 2025-06-12 DIAGNOSIS — G56.01 CARPAL TUNNEL SYNDROME OF RIGHT WRIST: Primary | ICD-10-CM

## 2025-06-12 RX ORDER — TRIAMCINOLONE ACETONIDE 40 MG/ML
40 INJECTION, SUSPENSION INTRA-ARTICULAR; INTRAMUSCULAR
Status: COMPLETED | OUTPATIENT
Start: 2025-06-12 | End: 2025-06-12

## 2025-06-12 RX ADMIN — TRIAMCINOLONE ACETONIDE 40 MG: 40 INJECTION, SUSPENSION INTRA-ARTICULAR; INTRAMUSCULAR at 17:13

## 2025-06-12 NOTE — PROGRESS NOTES
Primary Care Sports Medicine Office Visit Note    Patient ID: Queta Cuadra is a 50 y.o. female.    Chief Complaint:  Chief Complaint   Patient presents with   • Left Elbow - Pain, Follow-up   • Left Shoulder - Pain, Follow-up   • Cervical Spine - Pain, Follow-up       History of Present Illness  The patient presents for evaluation of numbness in both hands.    She reports persistent numbness in both hands, predominantly affecting her thumb and the adjacent three fingers. This numbness is most pronounced upon waking in the morning and occasionally at night. It is not constant but recurs throughout the day. The numbness is so severe that it impairs her ability to  objects or perceive tactile sensations when holding items. She also experiences pain in her wrist, which is particularly noticeable when performing tasks such as scooping ice cream. She has not received any steroid injections in her wrist.       Past Medical History:   Diagnosis Date   • Cyst (solitary) of breast, right 04/2024   • History of seasonal allergies        Past Surgical History:   Procedure Laterality Date   • EAR TUBES     • HYSTERECTOMY      full   • TONSILLECTOMY         Family History   Adopted: Yes   Problem Relation Age of Onset   • Pancreatic cancer Mother    • Hypertension Mother    • No Known Problems Father    • Ovarian cancer Sister    • No Known Problems Brother    • No Known Problems Maternal Grandmother    • Stomach cancer Maternal Grandfather    • No Known Problems Paternal Grandmother    • No Known Problems Paternal Grandfather    • No Known Problems Daughter    • No Known Problems Son    • No Known Problems Cousin    • No Known Problems Other    • Rheum arthritis Neg Hx    • Osteoarthritis Neg Hx    • Asthma Neg Hx    • Diabetes Neg Hx    • Heart failure Neg Hx    • Hyperlipidemia Neg Hx    • Migraines Neg Hx    • Rashes / Skin problems Neg Hx    • Seizures Neg Hx    • Stroke Neg Hx    • Thyroid disease Neg Hx      Social  "History     Occupational History   • Not on file   Tobacco Use   • Smoking status: Former     Current packs/day: 0.00     Average packs/day: 0.3 packs/day for 3.0 years (0.8 ttl pk-yrs)     Types: Cigarettes     Start date:      Quit date:      Years since quittin.4     Passive exposure: Past   • Smokeless tobacco: Never   Vaping Use   • Vaping status: Never Used   Substance and Sexual Activity   • Alcohol use: Yes     Alcohol/week: 2.0 standard drinks of alcohol     Types: 2 Cans of beer per week     Comment: rarely   • Drug use: Defer   • Sexual activity: Defer        Review of Systems:  Review of Systems   Constitutional:  Negative for activity change, fatigue and fever.   Musculoskeletal:  Positive for arthralgias.   Skin:  Negative for color change and rash.   Neurological:  Negative for numbness.     Objective:  Physical Exam  Pulse 98   Ht 154.9 cm (61\")   Wt 72.6 kg (160 lb)   SpO2 94%   BMI 30.23 kg/m²   Vitals and nursing note reviewed.   Constitutional:       General: she  is not in acute distress.     Appearance: she is well-developed. she is not diaphoretic.   HENT:      Head: Normocephalic and atraumatic.   Eyes:      Conjunctiva/sclera: Conjunctivae normal.   Pulmonary:      Effort: Pulmonary effort is normal. No respiratory distress.   Skin:     General: Skin is warm.      Capillary Refill: Capillary refill takes less than 2 seconds.   Neurological:      Mental Status: she is alert.     Ortho Exam:  Physical Exam  Neck: Supple, no abnormalities  Other: Examination of the right hand reveals a sensation deficit to the first three digits. Tinel's sign at the wrist is negative. Tinel's sign at the elbow is negative. Phalen's test is negative. Spurling's maneuver to the right is painful but does not reproduce numbness or tingling symptoms, essentially negative. Cervical spine range of motion is full.    Results  Imaging   - MRI of the neck: Slight wear and tear in multiple places, with no " "single spot being significantly worse than others. There is a bulging disk present, but it is not severe enough to pinch the spinal cord.    Assessment & Plan  1. Bilateral hand numbness.  - The patient reports constant numbness in both hands, particularly in the fingertips of the first three digits and the thumb. The numbness is worse in the mornings and sometimes at night, and it affects her ability to  objects.  - Physical examination reveals a sensation deficit in the first three digits of the right hand, with negative Tinel's sign at the wrist and elbow, and negative Phalen's test. Spurling's maneuver to the right is painful but does not reproduce numbness or tingling. Cervical spine range of motion is full.  - MRI shows mild arthritis and wear-and-tear changes at multiple levels of the cervical spine, but no significant single spot causing the symptoms. An EMG or nerve conduction study was discussed as a potential diagnostic tool.  - It was decided to proceed with a steroid injection in the wrist today to address possible carpal tunnel syndrome. If the injection does not provide relief, further diagnostic steps will be considered.    - Hand/Upper Extremity Injection: R carpal tunnel for carpal tunnel syndrome on 6/12/2025 5:13 PM  Details: 27 G needle, volar approach  Medications: 40 mg triamcinolone acetonide 40 MG/ML  Outcome: tolerated well, no immediate complications  Procedure, treatment alternatives, risks and benefits explained, specific risks discussed. Consent was given by the patient. Immediately prior to procedure a time out was called to verify the correct patient, procedure, equipment, support staff and site/side marked as required. Patient was prepped and draped in the usual sterile fashion.       Bernard BECKWITH \"Haseeb\" Evin CAMPOS DO CAQSM  06/12/25  17:12 EDT    Disclaimer: Please note that areas of this note were completed with computer voice recognition software.  Quite often unanticipated " grammatical, syntax, homophones, and other interpretive errors are inadvertently transcribed by the computer software. Please excuse any errors that have escaped final proofreading.    Patient or patient representative verbalized consent for the use of Ambient Listening during the visit with  Bernard Cleary II, DO for chart documentation. 17:12 EDT 06/12/25

## 2025-07-09 DIAGNOSIS — M77.12 LATERAL EPICONDYLITIS OF LEFT ELBOW: ICD-10-CM

## 2025-07-09 DIAGNOSIS — M54.12 CERVICAL RADICULOPATHY: ICD-10-CM

## 2025-07-09 RX ORDER — CITALOPRAM HYDROBROMIDE 10 MG/1
10 TABLET ORAL DAILY
Qty: 30 TABLET | Refills: 1 | Status: SHIPPED | OUTPATIENT
Start: 2025-07-09

## 2025-07-10 RX ORDER — MELOXICAM 15 MG/1
15 TABLET ORAL DAILY
Qty: 30 TABLET | Refills: 0 | Status: SHIPPED | OUTPATIENT
Start: 2025-07-10

## 2025-08-10 DIAGNOSIS — M77.12 LATERAL EPICONDYLITIS OF LEFT ELBOW: ICD-10-CM

## 2025-08-10 DIAGNOSIS — M54.12 CERVICAL RADICULOPATHY: ICD-10-CM

## 2025-08-11 RX ORDER — MELOXICAM 15 MG/1
15 TABLET ORAL DAILY
Qty: 30 TABLET | Refills: 0 | Status: SHIPPED | OUTPATIENT
Start: 2025-08-11